# Patient Record
Sex: MALE | Race: BLACK OR AFRICAN AMERICAN | Employment: FULL TIME | URBAN - METROPOLITAN AREA
[De-identification: names, ages, dates, MRNs, and addresses within clinical notes are randomized per-mention and may not be internally consistent; named-entity substitution may affect disease eponyms.]

---

## 2017-03-20 ENCOUNTER — TELEPHONE (OUTPATIENT)
Dept: INTERNAL MEDICINE CLINIC | Facility: CLINIC | Age: 37
End: 2017-03-20

## 2017-03-22 ENCOUNTER — TELEPHONE (OUTPATIENT)
Dept: INTERNAL MEDICINE CLINIC | Facility: CLINIC | Age: 37
End: 2017-03-22

## 2017-05-02 ENCOUNTER — TELEPHONE (OUTPATIENT)
Dept: INTERNAL MEDICINE CLINIC | Facility: CLINIC | Age: 37
End: 2017-05-02

## 2017-06-14 ENCOUNTER — TELEPHONE (OUTPATIENT)
Dept: INTERNAL MEDICINE CLINIC | Facility: CLINIC | Age: 37
End: 2017-06-14

## 2017-06-15 RX ORDER — VALACYCLOVIR HYDROCHLORIDE 1 G/1
TABLET, FILM COATED ORAL
Refills: 2 | COMMUNITY
Start: 2017-05-04 | End: 2017-08-18

## 2017-07-06 RX ORDER — VALACYCLOVIR HYDROCHLORIDE 1 G/1
TABLET, FILM COATED ORAL
Qty: 30 TABLET | Refills: 0 | Status: SHIPPED | OUTPATIENT
Start: 2017-07-06 | End: 2017-08-10

## 2017-07-19 RX ORDER — ALUMINUM CHLORIDE 20 %
SOLUTION, NON-ORAL TOPICAL
Qty: 60 ML | Refills: 0 | Status: SHIPPED | OUTPATIENT
Start: 2017-07-19 | End: 2018-08-30

## 2017-08-02 ENCOUNTER — TELEPHONE (OUTPATIENT)
Dept: INTERNAL MEDICINE CLINIC | Facility: CLINIC | Age: 37
End: 2017-08-02

## 2017-08-15 RX ORDER — VALACYCLOVIR HYDROCHLORIDE 1 G/1
TABLET, FILM COATED ORAL
Qty: 30 TABLET | Refills: 0 | Status: SHIPPED | OUTPATIENT
Start: 2017-08-15 | End: 2017-09-14

## 2017-08-18 ENCOUNTER — OFFICE VISIT (OUTPATIENT)
Dept: INTERNAL MEDICINE CLINIC | Facility: CLINIC | Age: 37
End: 2017-08-18

## 2017-08-18 VITALS
SYSTOLIC BLOOD PRESSURE: 120 MMHG | WEIGHT: 219.25 LBS | RESPIRATION RATE: 12 BRPM | BODY MASS INDEX: 32.85 KG/M2 | HEART RATE: 68 BPM | DIASTOLIC BLOOD PRESSURE: 70 MMHG | TEMPERATURE: 98 F | OXYGEN SATURATION: 98 % | HEIGHT: 68.5 IN

## 2017-08-18 DIAGNOSIS — F98.8 ATTENTION DEFICIT DISORDER, UNSPECIFIED HYPERACTIVITY PRESENCE: ICD-10-CM

## 2017-08-18 DIAGNOSIS — Z00.00 ENCOUNTER FOR PREVENTATIVE ADULT HEALTH CARE EXAMINATION: Primary | ICD-10-CM

## 2017-08-18 PROCEDURE — 99385 PREV VISIT NEW AGE 18-39: CPT | Performed by: INTERNAL MEDICINE

## 2017-08-18 NOTE — PATIENT INSTRUCTIONS
- When you get blood work done through work, drop off a copy for Dr. Jazlyn Valverde  - Follow up in six months with Dr. Jazlyn Valverde. It was a pleasure seeing you in the clinic today.   Thank you for choosing the [facility] office for your health

## 2017-08-18 NOTE — PROGRESS NOTES
Deyvi Rodríguez is a 29year old male. HPI:   Patient presents with:  CPX  Patient presents for CPX/wellness examination. He gets full blood work annually through his employer - usually every fall. Is due for employee blood work in 2 month.     He exerci includes appendectomy and breast surgery procedure unlisted. Family: family history includes Diabetes in an other family member. Social:  reports that he has never smoked. He has never used smokeless tobacco. He reports that he drinks alcohol.  He reports asked to return to clinic in 6 months with Dr. Lucero Urias MD for follow up on chronic issues, or earlier if acute issues arise.     Dennis Trotter MD

## 2017-09-15 RX ORDER — VALACYCLOVIR HYDROCHLORIDE 1 G/1
TABLET, FILM COATED ORAL
Qty: 30 TABLET | Refills: 0 | Status: SHIPPED | OUTPATIENT
Start: 2017-09-15 | End: 2017-10-19

## 2017-10-19 RX ORDER — VALACYCLOVIR HYDROCHLORIDE 1 G/1
TABLET, FILM COATED ORAL
Qty: 30 TABLET | Refills: 0 | Status: SHIPPED | OUTPATIENT
Start: 2017-10-19 | End: 2017-11-22

## 2017-11-24 RX ORDER — VALACYCLOVIR HYDROCHLORIDE 1 G/1
TABLET, FILM COATED ORAL
Qty: 30 TABLET | Refills: 0 | Status: SHIPPED | OUTPATIENT
Start: 2017-11-24 | End: 2017-12-27

## 2017-12-27 RX ORDER — VALACYCLOVIR HYDROCHLORIDE 1 G/1
TABLET, FILM COATED ORAL
Qty: 30 TABLET | Refills: 0 | Status: SHIPPED | OUTPATIENT
Start: 2017-12-27 | End: 2018-01-30

## 2017-12-29 ENCOUNTER — TELEPHONE (OUTPATIENT)
Dept: INTERNAL MEDICINE CLINIC | Facility: CLINIC | Age: 37
End: 2017-12-29

## 2018-01-31 RX ORDER — VALACYCLOVIR HYDROCHLORIDE 1 G/1
TABLET, FILM COATED ORAL
Qty: 30 TABLET | Refills: 0 | Status: SHIPPED | OUTPATIENT
Start: 2018-01-31 | End: 2018-03-01

## 2018-02-01 NOTE — TELEPHONE ENCOUNTER
Left message that pt Rx ready for .   Pt must make appointment with dr. Sona Jones for further refills

## 2018-02-01 NOTE — TELEPHONE ENCOUNTER
Patient returning phone call. Informed pt RX ready to picked up at office & to bring ID. No additional refills until appointment scheduled/seen by   Patient understood.

## 2018-02-01 NOTE — TELEPHONE ENCOUNTER
Per Dr. José Rodrigues instruction patient is to f/u with you in February but no pending appointment, please advise.

## 2018-02-01 NOTE — TELEPHONE ENCOUNTER
Patient calling in requesting a refill for Lisdexamfetamine Dimesylate 40 MG Oral Cap.     (Control Substance, so pt will need to  script from office)

## 2018-03-02 RX ORDER — VALACYCLOVIR HYDROCHLORIDE 1 G/1
TABLET, FILM COATED ORAL
Qty: 30 TABLET | Refills: 0 | Status: SHIPPED | OUTPATIENT
Start: 2018-03-02 | End: 2018-03-07

## 2018-03-07 PROBLEM — F98.8 ATTENTION DEFICIT DISORDER (ADD) WITHOUT HYPERACTIVITY: Status: ACTIVE | Noted: 2018-03-07

## 2018-03-07 NOTE — PROGRESS NOTES
HPI:    Patient ID: Itz Toure is a 40year old male.     HPI  Here for ADD ck   taking meds as directed   Doing well w it   Admits to eating more as wife is pregnant    Due in  June     Overall feels well   No issues w meds    On recent HSV outbreaks   e

## 2018-04-09 RX ORDER — VALACYCLOVIR HYDROCHLORIDE 1 G/1
TABLET, FILM COATED ORAL
Qty: 30 TABLET | Refills: 0 | OUTPATIENT
Start: 2018-04-09

## 2018-05-24 ENCOUNTER — CHARTING TRANS (OUTPATIENT)
Dept: OTHER | Age: 38
End: 2018-05-24

## 2018-07-02 ENCOUNTER — TELEPHONE (OUTPATIENT)
Dept: INTERNAL MEDICINE CLINIC | Facility: CLINIC | Age: 38
End: 2018-07-02

## 2018-08-30 RX ORDER — ALUMINUM CHLORIDE 20 %
SOLUTION, NON-ORAL TOPICAL
Qty: 60 ML | Refills: 0 | Status: SHIPPED | OUTPATIENT
Start: 2018-08-30 | End: 2018-12-13

## 2018-08-30 NOTE — TELEPHONE ENCOUNTER
Medication(s) to Refill:   Pending Prescriptions Disp Refills    DRYSOL 20 % External Solution [Pharmacy Med Name: DRYSOL 21% SOLUTION W/ APPLICATOR] 60 mL 0     Sig: APPLY EXTERNALLY TO THE AFFECTED AREA AT NIGHT       Last Time Medication was Filled:  7/

## 2018-09-05 ENCOUNTER — TELEPHONE (OUTPATIENT)
Dept: INTERNAL MEDICINE CLINIC | Facility: CLINIC | Age: 38
End: 2018-09-05

## 2018-09-05 NOTE — TELEPHONE ENCOUNTER
PA request received from [employer] for :    Drysol 20 % Solution w/ applicator  Qty: 60  Apply externally to the affected area at night      Called placed to [employer] to start prior authorization request.    Spoke with Lakesha Baker who state no alterna

## 2018-09-15 ENCOUNTER — HOSPITAL ENCOUNTER (OUTPATIENT)
Age: 38
Discharge: HOME | End: 2018-09-15
Payer: COMMERCIAL

## 2018-09-15 ENCOUNTER — APPOINTMENT (OUTPATIENT)
Dept: GENERAL RADIOLOGY | Age: 38
End: 2018-09-15
Attending: PHYSICIAN ASSISTANT
Payer: COMMERCIAL

## 2018-09-15 VITALS
HEART RATE: 75 BPM | TEMPERATURE: 99 F | WEIGHT: 210 LBS | OXYGEN SATURATION: 97 % | HEIGHT: 68 IN | RESPIRATION RATE: 16 BRPM | SYSTOLIC BLOOD PRESSURE: 125 MMHG | DIASTOLIC BLOOD PRESSURE: 76 MMHG | BODY MASS INDEX: 31.83 KG/M2

## 2018-09-15 DIAGNOSIS — S86.911A KNEE STRAIN, RIGHT, INITIAL ENCOUNTER: Primary | ICD-10-CM

## 2018-09-15 PROCEDURE — 73560 X-RAY EXAM OF KNEE 1 OR 2: CPT | Performed by: PHYSICIAN ASSISTANT

## 2018-09-15 PROCEDURE — 99213 OFFICE O/P EST LOW 20 MIN: CPT

## 2018-09-15 RX ORDER — IBUPROFEN 600 MG/1
600 TABLET ORAL EVERY 8 HOURS PRN
Qty: 30 TABLET | Refills: 0 | Status: SHIPPED | OUTPATIENT
Start: 2018-09-15 | End: 2018-09-22

## 2018-09-15 NOTE — ED PROVIDER NOTES
Patient Seen in: [facility]    History   Patient presents with:  Knee Pain    Stated Complaint: R knee pain/inury     HPI    Luciana Fong is a 50-year-old male who comes in today complaining of right knee injury that occurred yesterday respiratory distress. He has no wheezes. He has no rales. Musculoskeletal:        Right hip: Normal.        Right knee: He exhibits decreased range of motion (pain with full extension and flexion), swelling and LCL laxity (mild ).  He exhibits no effusion diagnosis)    Disposition:  There is no disposition on file for this visit. There is no disposition time on file for this visit.     Follow-up:  Lore Genao MD  [address] [facility] [address]          Franklin Rivera MD  [address]

## 2018-10-31 ENCOUNTER — TELEPHONE (OUTPATIENT)
Dept: INTERNAL MEDICINE CLINIC | Facility: CLINIC | Age: 38
End: 2018-10-31

## 2018-12-13 ENCOUNTER — LAB ENCOUNTER (OUTPATIENT)
Dept: LAB | Age: 38
End: 2018-12-13
Attending: FAMILY MEDICINE
Payer: COMMERCIAL

## 2018-12-13 ENCOUNTER — OFFICE VISIT (OUTPATIENT)
Dept: INTERNAL MEDICINE CLINIC | Facility: CLINIC | Age: 38
End: 2018-12-13
Payer: COMMERCIAL

## 2018-12-13 VITALS
BODY MASS INDEX: 32.89 KG/M2 | RESPIRATION RATE: 16 BRPM | SYSTOLIC BLOOD PRESSURE: 130 MMHG | WEIGHT: 217 LBS | HEART RATE: 89 BPM | DIASTOLIC BLOOD PRESSURE: 82 MMHG | HEIGHT: 68.25 IN | OXYGEN SATURATION: 99 %

## 2018-12-13 DIAGNOSIS — Z00.00 LABORATORY EXAM ORDERED AS PART OF ROUTINE GENERAL MEDICAL EXAMINATION: ICD-10-CM

## 2018-12-13 DIAGNOSIS — Z00.00 WELLNESS EXAMINATION: Primary | ICD-10-CM

## 2018-12-13 PROCEDURE — 85025 COMPLETE CBC W/AUTO DIFF WBC: CPT | Performed by: FAMILY MEDICINE

## 2018-12-13 PROCEDURE — 99395 PREV VISIT EST AGE 18-39: CPT | Performed by: FAMILY MEDICINE

## 2018-12-13 PROCEDURE — 80061 LIPID PANEL: CPT | Performed by: FAMILY MEDICINE

## 2018-12-13 PROCEDURE — 81003 URINALYSIS AUTO W/O SCOPE: CPT | Performed by: FAMILY MEDICINE

## 2018-12-13 PROCEDURE — 80053 COMPREHEN METABOLIC PANEL: CPT | Performed by: FAMILY MEDICINE

## 2018-12-13 PROCEDURE — 36415 COLL VENOUS BLD VENIPUNCTURE: CPT | Performed by: FAMILY MEDICINE

## 2019-05-02 ENCOUNTER — TELEPHONE (OUTPATIENT)
Dept: INTERNAL MEDICINE CLINIC | Facility: CLINIC | Age: 39
End: 2019-05-02

## 2019-06-19 RX ORDER — VALACYCLOVIR HYDROCHLORIDE 1 G/1
TABLET, FILM COATED ORAL
Qty: 30 TABLET | Refills: 2 | Status: SHIPPED | OUTPATIENT
Start: 2019-06-19 | End: 2019-12-12

## 2019-06-19 NOTE — TELEPHONE ENCOUNTER
Patient is requesting a refill on his prescription for ValACYclovir HCl 1 G Oral Tab. Needs to be sent to [place] [id number] [facility], [address] [facility]  [address] [facility], [phone], [phone]. Please advise.

## 2019-08-21 ENCOUNTER — TELEPHONE (OUTPATIENT)
Dept: INTERNAL MEDICINE CLINIC | Facility: CLINIC | Age: 39
End: 2019-08-21

## 2019-08-21 NOTE — TELEPHONE ENCOUNTER
Patient is requesting a refill on his prescription for [facility] [address] [facility], [address] [facility]  [address] [facility], [phone], [phone]. Please advise.

## 2019-08-23 NOTE — TELEPHONE ENCOUNTER
Patient scheduled an appointment with Dr. Dorys Hunter on Wednesday 08/28/19 for his medication follow up. Patient would like his prescription for Vyvanse  to be called into the pharmacy .  [address] [place] [address] DRUG STORE [address] [place] [address]

## 2019-09-24 NOTE — PROGRESS NOTES
CHIEF COMPLAINT:     Patient presents with:  Medication Follow-Up      HPI:   Ashish Price is a 41year old male who returns for recheck of ADHD treatment. Has been using the stimulant medication on a regular basis.  Feels the medication helps improve focus developed, well nourished, in no apparent distress  SKIN: no rashes, no suspicious lesions  HEAD: atraumatic, normocephalic  EYES: conjunctiva clear, EOM intact  LUNGS: clear to auscultation bilaterally, no wheezes or rhonchi. Breathing is non labored.   CA

## 2019-12-12 RX ORDER — VALACYCLOVIR HYDROCHLORIDE 1 G/1
TABLET, FILM COATED ORAL
Qty: 30 TABLET | Refills: 0 | Status: SHIPPED | OUTPATIENT
Start: 2019-12-12 | End: 2020-02-12

## 2019-12-12 NOTE — TELEPHONE ENCOUNTER
VALACYCLOVIR HCL 1 G Oral Tab    Passed Protocol    Last OV relevant to medication: 99/24/2019  Last refill date: 6/19/2019     #/refills: #30 w/ 2 refills   When pt was asked to return for OV: 3 months   Upcoming appt/reason: no future appointments

## 2020-01-04 ENCOUNTER — OFFICE VISIT (OUTPATIENT)
Dept: INTERNAL MEDICINE CLINIC | Facility: CLINIC | Age: 40
End: 2020-01-04
Payer: COMMERCIAL

## 2020-01-04 VITALS
SYSTOLIC BLOOD PRESSURE: 120 MMHG | HEART RATE: 94 BPM | HEIGHT: 68.25 IN | OXYGEN SATURATION: 98 % | BODY MASS INDEX: 34.1 KG/M2 | WEIGHT: 225 LBS | RESPIRATION RATE: 16 BRPM | DIASTOLIC BLOOD PRESSURE: 88 MMHG

## 2020-01-04 DIAGNOSIS — N52.9 ERECTILE DYSFUNCTION, UNSPECIFIED ERECTILE DYSFUNCTION TYPE: ICD-10-CM

## 2020-01-04 DIAGNOSIS — Z00.00 WELLNESS EXAMINATION: Primary | ICD-10-CM

## 2020-01-04 DIAGNOSIS — F98.8 ATTENTION DEFICIT DISORDER (ADD) WITHOUT HYPERACTIVITY: ICD-10-CM

## 2020-01-04 DIAGNOSIS — Z00.00 LABORATORY EXAM ORDERED AS PART OF ROUTINE GENERAL MEDICAL EXAMINATION: ICD-10-CM

## 2020-01-04 PROCEDURE — 90715 TDAP VACCINE 7 YRS/> IM: CPT | Performed by: FAMILY MEDICINE

## 2020-01-04 PROCEDURE — 99395 PREV VISIT EST AGE 18-39: CPT | Performed by: FAMILY MEDICINE

## 2020-01-04 PROCEDURE — 99213 OFFICE O/P EST LOW 20 MIN: CPT | Performed by: FAMILY MEDICINE

## 2020-01-04 PROCEDURE — 90471 IMMUNIZATION ADMIN: CPT | Performed by: FAMILY MEDICINE

## 2020-01-04 PROCEDURE — 90472 IMMUNIZATION ADMIN EACH ADD: CPT | Performed by: FAMILY MEDICINE

## 2020-01-04 PROCEDURE — 90686 IIV4 VACC NO PRSV 0.5 ML IM: CPT | Performed by: FAMILY MEDICINE

## 2020-01-04 RX ORDER — TADALAFIL 10 MG/1
10 TABLET ORAL
Qty: 10 TABLET | Refills: 2 | Status: SHIPPED | OUTPATIENT
Start: 2020-01-04 | End: 2020-11-27 | Stop reason: ALTCHOICE

## 2020-01-04 NOTE — PROGRESS NOTES
HPI:    Patient ID: Macy Cole is a 46year old male.     HPI  Macy Cole is a 46year old male who presents for a complete physical exam.   HPI:       Wt Readings from Last 6 Encounters:  01/04/20 : 225 lb (102.1 kg)  10/09/19 : 214 lb (97.1 kg)  09/24 related   Got kurt Rosario from friend and seemed to help him  Asking for rx      NEURO: denies headaches  PSYCHE:on vyvanse daily   Working well     HEMATOLOGIC: denies hx of anemia  ENDOCRINE: denies thyroid history  ALL/ASTHMA: denies hx of allergy or asth orders of the defined types were placed in this encounter.       Meds This Visit:  Requested Prescriptions      No prescriptions requested or ordered in this encounter       Imaging & Referrals:  None       EP#[id number]

## 2020-02-07 NOTE — TELEPHONE ENCOUNTER
Patient calling in requesting a refill for RX Lisdexamfetamine Dimesylate (VYVANSE) 40 MG Oral Cap     Pharmacy: [facility] [address] [facility], [address] [facility]  [address] [facility], [phone], [phone]

## 2020-02-12 RX ORDER — VALACYCLOVIR HYDROCHLORIDE 1 G/1
TABLET, FILM COATED ORAL
Qty: 90 TABLET | Refills: 3 | Status: SHIPPED | OUTPATIENT
Start: 2020-02-12 | End: 2021-03-31

## 2020-02-12 NOTE — TELEPHONE ENCOUNTER
Valacyclovir 1 g 1 tab daily filled 12-12-19 30 tab with 0 refills     LOV 1-4-20   return for CPX in 1yr.   No upcoming apt file     OV 3-7-18   On recent HSV outbreaks

## 2020-03-13 NOTE — TELEPHONE ENCOUNTER
Lisdexamfetamine 40 mg  Last OV relevant to medication: 1-4-2020  Last refill date: 2-10-20 #/refills: 0  When pt was asked to return for OV: CPX 1yr  Upcoming appt/reason: none  Recent labs: none

## 2020-05-11 ENCOUNTER — PATIENT MESSAGE (OUTPATIENT)
Dept: INTERNAL MEDICINE CLINIC | Facility: CLINIC | Age: 40
End: 2020-05-11

## 2020-05-12 NOTE — TELEPHONE ENCOUNTER
From: Chi Angel  To: Du Mcgarry MD  Sent: 5/11/2020 7:31 PM CDT  Subject: Alessia Perez, I am in need of a refill on my vyvanse. 30mg extended release. Thank you and stay safe.      Rayo Martinez

## 2020-05-12 NOTE — TELEPHONE ENCOUNTER
Requesting refill.     LOV: 1/4/2020  Asked to return for CPX in 1 yr  Last filled 4/10/2020 #30 no refills

## 2020-05-12 NOTE — TELEPHONE ENCOUNTER
From: Brett Gamino  To: Adiel Loredo MD  Sent: 5/11/2020 7:33 PM CDT  Subject: Prescription Question    I am sorry I meant 40mg extended release of vyvanse. It has been a long day. Thank you and have a lexii.      Mariella Pyle

## 2020-06-15 ENCOUNTER — PATIENT MESSAGE (OUTPATIENT)
Dept: INTERNAL MEDICINE CLINIC | Facility: CLINIC | Age: 40
End: 2020-06-15

## 2020-06-15 NOTE — TELEPHONE ENCOUNTER
From: Cece Arriaga  To: Aby Thomson MD  Sent: 6/15/2020 10:18 AM CDT  Subject: Cruzito Givens, may I please have a refill on my vyvanse. 40mg. Thank you and stay safe.     Tulio Orourke

## 2020-07-16 ENCOUNTER — PATIENT MESSAGE (OUTPATIENT)
Dept: INTERNAL MEDICINE CLINIC | Facility: CLINIC | Age: 40
End: 2020-07-16

## 2020-07-16 NOTE — TELEPHONE ENCOUNTER
Yari Kern RN 7/16/2020 12:13 PM CDT      ----- Message -----  From: Edyta Vann  Sent: 7/16/2020 11:36 AM CDT  To: [facility] Clinical Staff  Subject: Chandrika Melton, hopefully you and the family are well.  May I have a refill

## 2020-07-17 NOTE — TELEPHONE ENCOUNTER
LOV: 1/4/2020 with Dr. Maria G Rashid  RTC: 1 year  Last Relevant Labs: 12/13/18   Filled: 6/15/2020  #30 with 0 refills    No future appointments.

## 2020-08-17 ENCOUNTER — PATIENT MESSAGE (OUTPATIENT)
Dept: INTERNAL MEDICINE CLINIC | Facility: CLINIC | Age: 40
End: 2020-08-17

## 2020-08-19 ENCOUNTER — PATIENT MESSAGE (OUTPATIENT)
Dept: INTERNAL MEDICINE CLINIC | Facility: CLINIC | Age: 40
End: 2020-08-19

## 2020-08-19 NOTE — TELEPHONE ENCOUNTER
From: Darrow Dandy  To: Chetan Shah MD  Sent: 8/19/2020 1:08 PM CDT  Subject: Prescription Tiffani Rodger Dr. Familia Mccarty, how are you doing? Can you please call in a refill on my vyvanse at your earliest convenience. Thank you and stay safe.  Glenn Hyde

## 2020-08-19 NOTE — TELEPHONE ENCOUNTER
Lisdexamfetamine 40mg last filled 7/17/20 #30    LOV 1/4/20 - CPX     Labs ordered 1/4/20 -  Not completed

## 2020-09-17 ENCOUNTER — PATIENT MESSAGE (OUTPATIENT)
Dept: INTERNAL MEDICINE CLINIC | Facility: CLINIC | Age: 40
End: 2020-09-17

## 2020-09-17 NOTE — TELEPHONE ENCOUNTER
LOV: 1/4/2020 with Dr. Harley Perez  RTC: 1 year  Last Relevant Labs: 12/13/2018   Filled: 8/19/2020  #30 with 0 refills    No future appointments.

## 2020-09-17 NOTE — TELEPHONE ENCOUNTER
From: Dee Dee Linn  To: Carina Angela MD  Sent: 9/17/2020 11:34 AM CDT  Subject: Bertha Ricks, hopefully you and the family are well. May I please have a refill on my vyvanse. Thank you and take care.     Supriya Ramirez

## 2020-10-16 ENCOUNTER — PATIENT MESSAGE (OUTPATIENT)
Dept: INTERNAL MEDICINE CLINIC | Facility: CLINIC | Age: 40
End: 2020-10-16

## 2020-10-19 NOTE — TELEPHONE ENCOUNTER
Medication(s) to Refill:   Requested Prescriptions     Pending Prescriptions Disp Refills   • Lisdexamfetamine Dimesylate 40 MG Oral Cap 30 capsule 0     Sig: Take 1 capsule (40 mg total) by mouth every morning.        LOV: 1/4/2020   RTC: 1 yr for cpx

## 2020-11-17 RX ORDER — LISDEXAMFETAMINE DIMESYLATE CAPSULES 40 MG/1
40 CAPSULE ORAL EVERY MORNING
Qty: 30 CAPSULE | Refills: 0 | OUTPATIENT
Start: 2020-11-17

## 2020-11-21 RX ORDER — LISDEXAMFETAMINE DIMESYLATE CAPSULES 40 MG/1
40 CAPSULE ORAL EVERY MORNING
Qty: 30 CAPSULE | Refills: 0 | OUTPATIENT
Start: 2020-11-21

## 2020-11-24 RX ORDER — LISDEXAMFETAMINE DIMESYLATE CAPSULES 40 MG/1
40 CAPSULE ORAL EVERY MORNING
Qty: 30 CAPSULE | Refills: 0 | OUTPATIENT
Start: 2020-11-24

## 2020-11-24 RX ORDER — LISDEXAMFETAMINE DIMESYLATE CAPSULES 40 MG/1
40 CAPSULE ORAL EVERY MORNING
Qty: 30 CAPSULE | Refills: 0 | Status: CANCELLED | OUTPATIENT
Start: 2020-11-24

## 2020-11-25 ENCOUNTER — WALK IN (OUTPATIENT)
Dept: URGENT CARE | Age: 40
End: 2020-11-25
Attending: EMERGENCY MEDICINE

## 2020-11-25 ENCOUNTER — HOSPITAL ENCOUNTER (OUTPATIENT)
Dept: GENERAL RADIOLOGY | Age: 40
Discharge: HOME | End: 2020-11-25
Attending: EMERGENCY MEDICINE

## 2020-11-25 VITALS
DIASTOLIC BLOOD PRESSURE: 67 MMHG | TEMPERATURE: 96.4 F | OXYGEN SATURATION: 98 % | HEART RATE: 78 BPM | SYSTOLIC BLOOD PRESSURE: 124 MMHG | RESPIRATION RATE: 20 BRPM

## 2020-11-25 DIAGNOSIS — M54.31 SCIATICA OF RIGHT SIDE: Primary | ICD-10-CM

## 2020-11-25 DIAGNOSIS — M54.31 SCIATICA OF RIGHT SIDE: ICD-10-CM

## 2020-11-25 PROCEDURE — 10002803 HB RX 637: Performed by: EMERGENCY MEDICINE

## 2020-11-25 PROCEDURE — 99203 OFFICE O/P NEW LOW 30 MIN: CPT

## 2020-11-25 PROCEDURE — 72110 X-RAY EXAM L-2 SPINE 4/>VWS: CPT

## 2020-11-25 RX ORDER — METHYLPREDNISOLONE 4 MG/1
4 TABLET ORAL SEE ADMIN INSTRUCTIONS
Qty: 21 TABLET | Refills: 0 | Status: SHIPPED | OUTPATIENT
Start: 2020-11-25

## 2020-11-25 RX ORDER — CYCLOBENZAPRINE HCL 5 MG
5 TABLET ORAL 3 TIMES DAILY PRN
Qty: 21 TABLET | Refills: 0 | Status: SHIPPED | OUTPATIENT
Start: 2020-11-25 | End: 2020-12-02

## 2020-11-25 RX ORDER — HYDROCODONE BITARTRATE AND ACETAMINOPHEN 5; 325 MG/1; MG/1
2 TABLET ORAL ONCE
Status: COMPLETED | OUTPATIENT
Start: 2020-11-25 | End: 2020-11-25

## 2020-11-25 RX ORDER — HYDROCODONE BITARTRATE AND ACETAMINOPHEN 5; 325 MG/1; MG/1
1 TABLET ORAL EVERY 6 HOURS PRN
Qty: 16 TABLET | Refills: 0 | Status: SHIPPED | OUTPATIENT
Start: 2020-11-25 | End: 2020-11-29

## 2020-11-25 RX ADMIN — HYDROCODONE BITARTRATE AND ACETAMINOPHEN 2 TABLET: 5; 325 TABLET ORAL at 12:18

## 2020-11-27 NOTE — PROGRESS NOTES
CHIEF COMPLAINT:     Patient presents with:  Medication Follow-Up      HPI:   Deyvi Rodríguez is a 45year old male Patient returns for recheck of ADHD treatment. Has been using the stimulant medication on a regular basis.  Feels the medication has been helpin social    Drug use: No       REVIEW OF SYSTEMS:   GENERAL: Denies fever, chills,weight change, decreased appetite  EYES: Denies blurred vision or double vision  CHEST: Denies chest pain, or palpitations  LUNGS: Denies shortness of breath, cough, or wheezin

## 2020-12-17 ENCOUNTER — IMMUNIZATION (OUTPATIENT)
Dept: LAB | Facility: HOSPITAL | Age: 40
End: 2020-12-17
Attending: PREVENTIVE MEDICINE
Payer: COMMERCIAL

## 2020-12-17 DIAGNOSIS — Z23 NEED FOR VACCINATION: ICD-10-CM

## 2020-12-17 PROCEDURE — 0001A PFIZER-BIONTECH COVID-19 VACCINE: CPT

## 2020-12-28 ENCOUNTER — TELEPHONE (OUTPATIENT)
Dept: INTERNAL MEDICINE CLINIC | Facility: CLINIC | Age: 40
End: 2020-12-28

## 2020-12-28 DIAGNOSIS — F98.8 ATTENTION DEFICIT DISORDER (ADD) WITHOUT HYPERACTIVITY: ICD-10-CM

## 2020-12-28 NOTE — TELEPHONE ENCOUNTER
Vyvanse 40 mg  Filled 12-28-20  Qty 30  0 refills   LOV 11-27-20    Pt still has 1 more month on 1-28-21.

## 2020-12-29 DIAGNOSIS — F98.8 ATTENTION DEFICIT DISORDER (ADD) WITHOUT HYPERACTIVITY: ICD-10-CM

## 2020-12-30 NOTE — TELEPHONE ENCOUNTER
Patient is calling to check on the status of his refill request for Lisdexamfetamine Dimesylate 40 mg Oral Daily. Patient is out of refills. He would like to speak with a nurse. [address] [facility], [address] [facility] [address]

## 2021-01-07 ENCOUNTER — IMMUNIZATION (OUTPATIENT)
Dept: LAB | Facility: HOSPITAL | Age: 41
End: 2021-01-07
Attending: PREVENTIVE MEDICINE
Payer: COMMERCIAL

## 2021-01-07 DIAGNOSIS — Z23 NEED FOR VACCINATION: ICD-10-CM

## 2021-01-07 PROCEDURE — 0002A SARSCOV2 VAC 30MCG/0.3ML IM: CPT

## 2021-02-01 DIAGNOSIS — F98.8 ATTENTION DEFICIT DISORDER (ADD) WITHOUT HYPERACTIVITY: ICD-10-CM

## 2021-02-03 NOTE — TELEPHONE ENCOUNTER
Called pharmacy to see if rx was filled on 1/28   Pharmacist stated that pt picked up on 2/1   No need for a refill at this time     No protocol   Medication(s) to Refill:   Requested Prescriptions     Pending Prescriptions Disp Refills   • Lisdexamfetamin

## 2021-03-02 ENCOUNTER — MED REC SCAN ONLY (OUTPATIENT)
Dept: INTERNAL MEDICINE CLINIC | Facility: CLINIC | Age: 41
End: 2021-03-02

## 2021-03-03 NOTE — TELEPHONE ENCOUNTER
Lisdexamfetamine Dimesylate 40 MG Oral Cap           Possible duplicate: Polina to review recent actions on this medication    Sig: Take 1 capsule (40 mg total) by mouth every morning.     Disp:  30 capsule    Refills:  0    Start: 3/1/2021    Earliest Fill

## 2021-03-04 NOTE — TELEPHONE ENCOUNTER
Medication(s) to Refill:   Requested Prescriptions     Pending Prescriptions Disp Refills   • Lisdexamfetamine Dimesylate 40 MG Oral Cap 30 capsule 0     Sig: Take 1 capsule (40 mg total) by mouth every morning.        LOV:  11-    RTC: 3 months

## 2021-03-31 RX ORDER — VALACYCLOVIR HYDROCHLORIDE 1 G/1
1 TABLET, FILM COATED ORAL DAILY
Qty: 90 TABLET | Refills: 0 | Status: SHIPPED | OUTPATIENT
Start: 2021-03-31 | End: 2021-06-01

## 2021-03-31 NOTE — TELEPHONE ENCOUNTER
Medication(s) to Refill:   Requested Prescriptions     Pending Prescriptions Disp Refills   • valACYclovir HCl 1 G Oral Tab 90 tablet 0     Sig: Take 1 tablet (1,000 mg total) by mouth daily.    • Lisdexamfetamine Dimesylate 40 MG Oral Cap 30 capsule 0

## 2021-04-30 ENCOUNTER — TELEPHONE (OUTPATIENT)
Dept: INTERNAL MEDICINE CLINIC | Facility: CLINIC | Age: 41
End: 2021-04-30

## 2021-05-03 RX ORDER — LISDEXAMFETAMINE DIMESYLATE CAPSULES 40 MG/1
40 CAPSULE ORAL EVERY MORNING
Qty: 30 CAPSULE | Refills: 0 | Status: CANCELLED | OUTPATIENT
Start: 2021-05-03

## 2021-05-03 RX ORDER — LISDEXAMFETAMINE DIMESYLATE CAPSULES 40 MG/1
40 CAPSULE ORAL EVERY MORNING
Qty: 30 CAPSULE | Refills: 0 | OUTPATIENT
Start: 2021-05-03

## 2021-05-03 RX ORDER — LISDEXAMFETAMINE DIMESYLATE CAPSULES 40 MG/1
40 CAPSULE ORAL EVERY MORNING
Qty: 15 CAPSULE | Refills: 0 | OUTPATIENT
Start: 2021-05-03

## 2021-05-03 NOTE — TELEPHONE ENCOUNTER
Future Appointments   Date Time Provider Zoltan Leblanc   5/5/2021  4:00 PM TEAGAN Malin [facility] [place]

## 2021-05-03 NOTE — TELEPHONE ENCOUNTER
Responded to pts Mychart message informing him that he is due for CPX a well as lab work - pt also informed that lab orders have been placed. #15 qty pended as temp fill until pt is seen      Requesting Lisdexamfetamine Dimesylate 40 MG Oral Cap  LOV: 11/27/20  RTC: 3 months  Last Relevant Labs: 12/13/18  Filled: 3/31/21 #30 with 0 refills    No future appointments.

## 2021-05-03 NOTE — TELEPHONE ENCOUNTER
Pt called and made appointment with dr Cece Orta on 5/5.      Pt advd he is out of medication listed below -     Lisdexamfetamine Dimesylate 40 MG Oral Cap 30 capsule       [facility] #[id number] - [address], [address] [facility], [phone], [phone]

## 2021-05-05 RX ORDER — LISDEXAMFETAMINE DIMESYLATE CAPSULES 40 MG/1
40 CAPSULE ORAL EVERY MORNING
Qty: 30 CAPSULE | Refills: 0 | Status: CANCELLED | OUTPATIENT
Start: 2021-05-05

## 2021-05-05 NOTE — TELEPHONE ENCOUNTER
Patient had melissa appointment with [facility] today but she is out sick. Patient rescheduled for Monday but has no more medication.      Future Appointments   Date Time Provider Zoltan Leblanc   5/10/2021 11:00 AM TEAGAN Luna [facility] [place]

## 2021-05-05 NOTE — TELEPHONE ENCOUNTER
Last OV:  Last labs:***  Last time medication was refilled:***  Medication routed to PCP for refill if appropriate.    Future Appointments   Date Time Provider Zoltan Leblanc   5/10/2021 11:00 AM TEAGAN Alexander [facility] [place]

## 2021-05-06 NOTE — TELEPHONE ENCOUNTER
Patient has upcoming appointment with Ruperto Goldstein    Future Appointments   Date Time Provider Zoltan Leblanc   5/10/2021 11:00 AM TEAGAN Rosales [facility] [place]

## 2021-05-10 PROBLEM — R73.01 IMPAIRED FASTING GLUCOSE: Status: ACTIVE | Noted: 2021-05-10

## 2021-05-10 PROBLEM — E78.2 MIXED HYPERLIPIDEMIA: Status: ACTIVE | Noted: 2021-05-10

## 2021-05-10 NOTE — PROGRESS NOTES
Jaziel Mahoney is a 45year old male. HPI:   Patient presents for recheck of his ADHD. Patient has been using the stimulant medication, Vyvance, on a regular basis. Patient was last seen 6 months ago. Medication changes at that time: none.  Patient feel tobacco: Never Used    Vaping Use      Vaping Use: Never used    Alcohol use: Yes      Comment: social    Drug use: No        REVIEW OF SYSTEMS:   GENERAL:  Denies fever, chills,decreased appetite and weakness. SKIN: Denies rashes, skin wounds or ulcers. daily.  Dispense: 30 capsule; Refill: 0  - Lisdexamfetamine Dimesylate (VYVANSE) 40 MG Oral Cap; Take 1 capsule (40 mg total) by mouth daily. Dispense: 30 capsule; Refill: 0  - Lisdexamfetamine Dimesylate (VYVANSE) 40 MG Oral Cap;  Take 1 capsule (40 mg to

## 2021-05-28 ENCOUNTER — LAB ENCOUNTER (OUTPATIENT)
Dept: LAB | Age: 41
End: 2021-05-28
Attending: FAMILY MEDICINE
Payer: COMMERCIAL

## 2021-05-28 ENCOUNTER — OFFICE VISIT (OUTPATIENT)
Dept: INTERNAL MEDICINE CLINIC | Facility: CLINIC | Age: 41
End: 2021-05-28
Payer: COMMERCIAL

## 2021-05-28 VITALS
SYSTOLIC BLOOD PRESSURE: 115 MMHG | BODY MASS INDEX: 32.06 KG/M2 | OXYGEN SATURATION: 98 % | DIASTOLIC BLOOD PRESSURE: 70 MMHG | HEIGHT: 68.5 IN | HEART RATE: 82 BPM | WEIGHT: 214 LBS | TEMPERATURE: 99 F | RESPIRATION RATE: 16 BRPM

## 2021-05-28 DIAGNOSIS — E78.2 MIXED HYPERLIPIDEMIA: ICD-10-CM

## 2021-05-28 DIAGNOSIS — R73.01 IMPAIRED FASTING GLUCOSE: ICD-10-CM

## 2021-05-28 DIAGNOSIS — R59.1 LYMPHADENOPATHY: ICD-10-CM

## 2021-05-28 DIAGNOSIS — S16.1XXA STRAIN OF NECK MUSCLE, INITIAL ENCOUNTER: ICD-10-CM

## 2021-05-28 DIAGNOSIS — R09.82 POST-NASAL DRIP: Primary | ICD-10-CM

## 2021-05-28 PROCEDURE — 80061 LIPID PANEL: CPT | Performed by: FAMILY MEDICINE

## 2021-05-28 PROCEDURE — 3074F SYST BP LT 130 MM HG: CPT | Performed by: NURSE PRACTITIONER

## 2021-05-28 PROCEDURE — 82947 ASSAY GLUCOSE BLOOD QUANT: CPT | Performed by: FAMILY MEDICINE

## 2021-05-28 PROCEDURE — 99213 OFFICE O/P EST LOW 20 MIN: CPT | Performed by: NURSE PRACTITIONER

## 2021-05-28 PROCEDURE — 3078F DIAST BP <80 MM HG: CPT | Performed by: NURSE PRACTITIONER

## 2021-05-28 PROCEDURE — 83036 HEMOGLOBIN GLYCOSYLATED A1C: CPT | Performed by: FAMILY MEDICINE

## 2021-05-28 PROCEDURE — 3008F BODY MASS INDEX DOCD: CPT | Performed by: NURSE PRACTITIONER

## 2021-05-28 NOTE — PATIENT INSTRUCTIONS
Neck Clock (Flexibility)    1. Lie on your back on the floor, with your knees bent and your feet flat on the floor. Place a rolled-up towel or neck roll under your neck. 2. Close your eyes and imagine a clock face.  With your nose, slowly trace the outer garcía Fowler last reviewed this educational content on 3/1/2020  © 3429-7928 [employer]. All rights reserved. This information is not intended as a substitute for professional medical care.  Always follow your healthcare professional's in Then turn to the other side. · Repeat  5 times on each side.   Note: Keep your shoulders on the floor. Don’t lift your chin as you turn your head. Janeth last reviewed this educational content on 7/1/2020  © 6923-8428 [employer].  All rig

## 2021-05-28 NOTE — PROGRESS NOTES
CHIEF COMPLAINT:     Patient presents with:  Post Nasal Drip: For the past weeks;      HPI:   Chi Angel is a 45year old male here with concerns of sore throat and neck for the past 2 weeks.  Pt states he is physically active in [employer] and wrestling and SpO2 98%   BMI 32.07 kg/m²   GENERAL: well developed, well nourished,in no apparent distress  SKIN: no rashes, no suspicious lesions  HEAD: atraumatic, normocephalic  EYES: conjunctiva clear, EOM intact, PERRLA  EARS: TM's translucent, no bulging, no ret

## 2021-06-01 DIAGNOSIS — F98.8 ATTENTION DEFICIT DISORDER (ADD) WITHOUT HYPERACTIVITY: ICD-10-CM

## 2021-06-01 RX ORDER — VALACYCLOVIR HYDROCHLORIDE 1 G/1
1 TABLET, FILM COATED ORAL DAILY
Qty: 90 TABLET | Refills: 0 | Status: SHIPPED | OUTPATIENT
Start: 2021-06-01 | End: 2021-07-11

## 2021-06-01 NOTE — TELEPHONE ENCOUNTER
Vyvanse 40 mg  Filled 5-10-21  3 scripts on file for 5-10-21, 06-10-21, 07-11-21. Pt to call pharmacy to fill since all scripts were sent. Qty 30  0 refills  No upcoming orders  LOV 5-10-21  RTC 6 mo.

## 2021-06-07 ENCOUNTER — PATIENT MESSAGE (OUTPATIENT)
Dept: INTERNAL MEDICINE CLINIC | Facility: CLINIC | Age: 41
End: 2021-06-07

## 2021-06-07 DIAGNOSIS — F98.8 ATTENTION DEFICIT DISORDER (ADD) WITHOUT HYPERACTIVITY: ICD-10-CM

## 2021-06-07 NOTE — TELEPHONE ENCOUNTER
From: Em Oconnor  To: Sabina Alatorre MD  Sent: 6/7/2021 12:05 PM CDT  Subject: Prescription Marsha Tello, yesterday my prescription to vyvanse ran out. Is there anything I need to do to get a refill? I have sent a request twice via [employer].

## 2021-06-08 NOTE — TELEPHONE ENCOUNTER
LOV: 5/28/2021 with TEAGAN Austin  RTC: no follow-up on file  Last Relevant Labs: 5/28/2021   Filled: 5/10/2021   #30 with 0 refills    No future appointments.

## 2021-07-12 ENCOUNTER — PATIENT MESSAGE (OUTPATIENT)
Dept: INTERNAL MEDICINE CLINIC | Facility: CLINIC | Age: 41
End: 2021-07-12

## 2021-07-12 DIAGNOSIS — F98.8 ATTENTION DEFICIT DISORDER (ADD) WITHOUT HYPERACTIVITY: ICD-10-CM

## 2021-07-12 RX ORDER — VALACYCLOVIR HYDROCHLORIDE 1 G/1
1 TABLET, FILM COATED ORAL DAILY
Qty: 90 TABLET | Refills: 0 | Status: SHIPPED | OUTPATIENT
Start: 2021-07-12 | End: 2021-11-30

## 2021-07-12 RX ORDER — LISDEXAMFETAMINE DIMESYLATE CAPSULES 40 MG/1
40 CAPSULE ORAL DAILY
Qty: 30 CAPSULE | Refills: 0 | OUTPATIENT
Start: 2021-07-12 | End: 2021-08-11

## 2021-07-12 RX ORDER — LISDEXAMFETAMINE DIMESYLATE CAPSULES 40 MG/1
40 CAPSULE ORAL EVERY MORNING
Qty: 30 CAPSULE | Refills: 0 | OUTPATIENT
Start: 2021-07-12

## 2021-07-12 NOTE — TELEPHONE ENCOUNTER
Valacyclovir   Filled 6-1-21  Qty 90  0 refills  No upcoming appt. Lisdexamfetamine 40 mg  Filled 6-8-21  Qty 30  0 refills  No upcoming appt.   LOV 5-10-21

## 2021-07-12 NOTE — TELEPHONE ENCOUNTER
New rx needed to [facility] #[id number]  Previously sent to kayleigh Funes unable to transfer     Lisdexamfetamine Dimesylate (VYVANSE) 40 MG Oral Cap

## 2021-07-13 NOTE — TELEPHONE ENCOUNTER
From: Itz Toure  To: Cait Alba MD  Sent: 7/12/2021 5:46 PM CDT  Subject: Prescription Question    Can you please send a script for vyvanse to the [facility] on the corner of [address] The address is [address] Thank you.

## 2021-07-13 NOTE — TELEPHONE ENCOUNTER
From: Linh Fischer  To:  TEAGAN García  Sent: 7/12/2021 5:48 PM CDT  Subject: Prescription Question    Can you please send a script for a refill of vyvanse to the [facility] in [place] on the corner of [address] The address [address] [place]

## 2021-08-13 ENCOUNTER — PATIENT MESSAGE (OUTPATIENT)
Dept: INTERNAL MEDICINE CLINIC | Facility: CLINIC | Age: 41
End: 2021-08-13

## 2021-08-13 DIAGNOSIS — F98.8 ATTENTION DEFICIT DISORDER (ADD) WITHOUT HYPERACTIVITY: ICD-10-CM

## 2021-08-14 NOTE — TELEPHONE ENCOUNTER
From: Ashish Price  To: TEAGAN Camacho  Sent: 8/13/2021 10:32 PM CDT  Subject: Prescription Question    Hello, may I please have a refill on my vyvanse and can it please be sent to the [facility] on [address] on [place]? Thank you.      Deb Quinones

## 2021-08-14 NOTE — TELEPHONE ENCOUNTER
From: Linh Fischer  To: Alma Rosa Branch MD  Sent: 8/13/2021 10:30 PM CDT  Subject: Prescription Question    Hello, may I please have a refill on my vyvanse. Can it please be sent to the pharmacy on [address] in [place] [address]. Thank you.      Keagan Rojas

## 2021-08-14 NOTE — TELEPHONE ENCOUNTER
No Protocol     Requesting: Vyvanse 40mg      LOV: 5/28/21 with KR  RTC: if sx persist   Filled: 7/13/21 #30 0 refill   Recent Labs: 5/28/21     Upcoming OV: none scheduled

## 2021-09-14 DIAGNOSIS — F98.8 ATTENTION DEFICIT DISORDER (ADD) WITHOUT HYPERACTIVITY: ICD-10-CM

## 2021-09-14 NOTE — TELEPHONE ENCOUNTER
Lisdexamfetamine Dimesylate (VYVANSE) 40 MG Oral Cap          Sig: Take 1 capsule (40 mg total) by mouth daily.     Disp:  30 capsule    Refills:  0    Start: 9/14/2021 - 10/14/2021    Earliest Fill Date: 9/14/2021    Class: Normal    Non-formulary For: Tollie Navy

## 2021-10-11 DIAGNOSIS — F98.8 ATTENTION DEFICIT DISORDER (ADD) WITHOUT HYPERACTIVITY: ICD-10-CM

## 2021-11-15 NOTE — PROGRESS NOTES
Abbie Griffin is a 37year old male. HPI:   Patient presents for recheck of his ADHD. Patient has been using the stimulant medication, Vyvanse 40 mg, on a regular basis. Patient was last seen 6 months ago. Medication changes at that time: none.   Fransisco (1.74 m)   Wt 217 lb (98.4 kg)   BMI 32.51 kg/m²   GENERAL: well developed, well nourished,in no apparent distress  LUNGS: clear to auscultation  CARDIO: RRR without murmur  PSYCH:  Alert, good attention.   Mood, affect, and behavior is normal.    ASSESSMEN

## 2021-11-30 RX ORDER — VALACYCLOVIR HYDROCHLORIDE 1 G/1
1000 TABLET, FILM COATED ORAL DAILY
Qty: 90 TABLET | Refills: 0 | Status: SHIPPED | OUTPATIENT
Start: 2021-11-30 | End: 2022-01-17

## 2022-01-17 RX ORDER — VALACYCLOVIR HYDROCHLORIDE 1 G/1
1000 TABLET, FILM COATED ORAL DAILY
Qty: 90 TABLET | Refills: 0 | Status: SHIPPED | OUTPATIENT
Start: 2022-01-17

## 2022-01-17 NOTE — TELEPHONE ENCOUNTER
valACYclovir 1 G Oral Tab          Sig: Take 1 tablet (1,000 mg total) by mouth daily.     Disp:  90 tablet    Refills:  0    Start: 1/17/2022    Class: Normal    Non-formulary    Last ordered: 1 month ago by Harvey Resendiz MD     Herpes Agent Protocol [place]

## 2022-01-17 NOTE — TELEPHONE ENCOUNTER
Lisdexamfetamine Dimesylate (VYVANSE) 50 MG Oral Cap          Sig: Take 1 capsule (50 mg total) by mouth every morning.     Disp:  30 capsule    Refills:  0    Start: 1/17/2022    Earliest Fill Date: 1/17/2022    Class: Normal    Non-formulary    Last order

## 2022-02-03 ENCOUNTER — TELEPHONE (OUTPATIENT)
Dept: INTERNAL MEDICINE CLINIC | Facility: CLINIC | Age: 42
End: 2022-02-03

## 2022-02-03 NOTE — TELEPHONE ENCOUNTER
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 Paperwork placed at dr. Jeanine Oliveros folder to review.

## 2022-02-07 ENCOUNTER — TELEPHONE (OUTPATIENT)
Dept: INTERNAL MEDICINE CLINIC | Facility: CLINIC | Age: 42
End: 2022-02-07

## 2022-02-07 LAB
ALBUMIN: 4.3 G/DL
ALKALINE PHOSPHATASE: 48
ALT: 23
ANION GAP: 10.7
AST: 21
BASO (ABSOLUTE): 0.1 X10E3/UL
BASOPHIL %: 0.6
BILIRUBIN, TOTAL: 0.6 MG/DL
BLOOD URINE: NEGATIVE
BUN: 25 MG/DL (ref 7–22)
CALCIUM: 9.2
CARBON DIOXIDE: 27.3
CHLORIDE: 103
CHOL/HDL RATIO: 3.5
CLARITY URINE: CLEAR
COLOR: YELLOW
CREATININE: 0.9 MG/DL
EOS (ABSOLUTE): 0.1 X10E3/UL
EOSINOPHIL %: 0.9
GLUCOSE: 84
HCT: 41
HDL CHOLESTEROL: 64 MG/DL
HGB: 13.6 G/DL
LDL CHOLESTEROL: 150 MG/DL (ref ?–130)
LYMPHOCYTE %: 12.5
LYMPHOCYTE ABSOLUTE: 1.2
Lab: NEGATIVE
MCH: 31.4 PG
MCHC: 33.2 G/DL
MCV: 94.7 FL
MONOCYTE %: 5.7
MONOCYTES(ABSOLUTE): 0.6
MPV: 7.6
NEUTROPHIL %: 80.3
NEUTROPHIL ABSOLUTE: 7.8
NITRITE, URINE: NEGATIVE
NON HDL CHOL: 157
PLT: 314
POTASSIUM: 4.2
RBC: 4.33 /HPF
RDW: 13.6 %
SODIUM: 141
SPECIFIC GRAVITY, URINE: 1.03
TOTAL CHOLESTEROL: 221 MG/DL (ref ?–200)
TOTAL PROTEIN: 6.9
TRIGLYCERIDES: 35 MG/DL
URINE BILIRUBIN: NEGATIVE
URINE GLUCOSE: NEGATIVE MG/DL
URINE KETONE: NEGATIVE
URINE LEUKOCYTE ESTERASE: NEGATIVE
URINE PH: 5
URINE PROTEIN: NEGATIVE MG /DL
UROBILINOGEN URINE: NEGATIVE
VLDL: 7
WBC: 9.7 /HPF

## 2022-02-07 NOTE — TELEPHONE ENCOUNTER
Incoming fax from [address] [place] [address] BB    Patients [place] entered into [address]

## 2022-02-19 RX ORDER — LISDEXAMFETAMINE DIMESYLATE CAPSULES 50 MG/1
50 CAPSULE ORAL EVERY MORNING
Qty: 30 CAPSULE | Refills: 0 | Status: SHIPPED | OUTPATIENT
Start: 2022-02-19 | End: 2022-03-16

## 2022-03-16 RX ORDER — VALACYCLOVIR HYDROCHLORIDE 1 G/1
1000 TABLET, FILM COATED ORAL DAILY
Qty: 90 TABLET | Refills: 0 | Status: SHIPPED | OUTPATIENT
Start: 2022-03-16

## 2022-03-16 NOTE — TELEPHONE ENCOUNTER
mychart (Active today) message sent to pt reminding pt on his LOV, he was told to schedule his Px in Jan. 2022. Vyvanse 50 mg  Filled 2-19-22  Qty 30  0 refills  No upcoming appt.   LOV 11-15-21

## 2022-04-15 RX ORDER — ROSUVASTATIN CALCIUM 5 MG/1
5 TABLET, COATED ORAL NIGHTLY
Qty: 90 TABLET | Refills: 0 | Status: CANCELLED | OUTPATIENT
Start: 2022-04-15

## 2022-04-15 RX ORDER — VALACYCLOVIR HYDROCHLORIDE 1 G/1
1000 TABLET, FILM COATED ORAL DAILY
Qty: 90 TABLET | Refills: 0 | Status: CANCELLED | OUTPATIENT
Start: 2022-04-15

## 2022-05-13 RX ORDER — VALACYCLOVIR HYDROCHLORIDE 1 G/1
1000 TABLET, FILM COATED ORAL DAILY
Qty: 90 TABLET | Refills: 0 | Status: SHIPPED | OUTPATIENT
Start: 2022-05-13

## 2022-06-09 DIAGNOSIS — E78.5 HYPERLIPIDEMIA, UNSPECIFIED HYPERLIPIDEMIA TYPE: ICD-10-CM

## 2022-06-09 RX ORDER — ROSUVASTATIN CALCIUM 5 MG/1
TABLET, COATED ORAL
Qty: 90 TABLET | Refills: 0 | Status: SHIPPED | OUTPATIENT
Start: 2022-06-09

## 2022-06-15 RX ORDER — VALACYCLOVIR HYDROCHLORIDE 1 G/1
1000 TABLET, FILM COATED ORAL DAILY
Qty: 90 TABLET | Refills: 0 | OUTPATIENT
Start: 2022-06-15

## 2022-06-15 NOTE — TELEPHONE ENCOUNTER
Sent [facility] to pt stating he will get a denial message bc we have received duplicate request.

## 2022-07-13 ENCOUNTER — TELEMEDICINE (OUTPATIENT)
Dept: INTERNAL MEDICINE CLINIC | Facility: CLINIC | Age: 42
End: 2022-07-13
Payer: COMMERCIAL

## 2022-07-13 DIAGNOSIS — J01.90 ACUTE NON-RECURRENT SINUSITIS, UNSPECIFIED LOCATION: Primary | ICD-10-CM

## 2022-07-13 PROCEDURE — 99213 OFFICE O/P EST LOW 20 MIN: CPT | Performed by: FAMILY MEDICINE

## 2022-07-13 RX ORDER — VALACYCLOVIR HYDROCHLORIDE 1 G/1
1000 TABLET, FILM COATED ORAL DAILY
Qty: 30 TABLET | Refills: 0 | Status: SHIPPED | OUTPATIENT
Start: 2022-07-13

## 2022-07-13 RX ORDER — AZITHROMYCIN 250 MG/1
TABLET, FILM COATED ORAL
Qty: 6 TABLET | Refills: 0 | Status: SHIPPED | OUTPATIENT
Start: 2022-07-13 | End: 2022-07-18

## 2022-08-01 ENCOUNTER — EMPLOYEE HEALTH (OUTPATIENT)
Dept: OTHER | Age: 42
End: 2022-08-01

## 2022-08-05 ENCOUNTER — LAB REQUISITION (OUTPATIENT)
Dept: LAB | Age: 42
End: 2022-08-05

## 2022-08-05 DIAGNOSIS — Z00.00 ENCOUNTER FOR GENERAL ADULT MEDICAL EXAMINATION WITHOUT ABNORMAL FINDINGS: ICD-10-CM

## 2022-08-05 PROCEDURE — PSEU8542 MUMPS IMMUNE ANTIBODY IGG: Performed by: CLINICAL MEDICAL LABORATORY

## 2022-08-05 PROCEDURE — PSEU8571 RUBELLA ANTIBODY IGG: Performed by: CLINICAL MEDICAL LABORATORY

## 2022-08-05 PROCEDURE — 86706 HEP B SURFACE ANTIBODY: CPT | Performed by: CLINICAL MEDICAL LABORATORY

## 2022-08-05 PROCEDURE — 86765 RUBEOLA ANTIBODY: CPT | Performed by: CLINICAL MEDICAL LABORATORY

## 2022-08-05 PROCEDURE — 86787 VARICELLA-ZOSTER ANTIBODY: CPT | Performed by: CLINICAL MEDICAL LABORATORY

## 2022-08-05 PROCEDURE — PSEU8230 VARICELLA ZOSTER IMMUNITY IGG: Performed by: CLINICAL MEDICAL LABORATORY

## 2022-08-05 PROCEDURE — PSEU9049 QUANTIFERON TB PLUS: Performed by: CLINICAL MEDICAL LABORATORY

## 2022-08-05 PROCEDURE — 86735 MUMPS ANTIBODY: CPT | Performed by: CLINICAL MEDICAL LABORATORY

## 2022-08-05 PROCEDURE — PSEU8553 RUBEOLA IMMUNITY IGG: Performed by: CLINICAL MEDICAL LABORATORY

## 2022-08-05 PROCEDURE — 86480 TB TEST CELL IMMUN MEASURE: CPT | Performed by: CLINICAL MEDICAL LABORATORY

## 2022-08-05 PROCEDURE — PSEU8106 HEPATITIS B SURFACE ANTIBODY, QUANTITATIVE: Performed by: CLINICAL MEDICAL LABORATORY

## 2022-08-05 PROCEDURE — 86762 RUBELLA ANTIBODY: CPT | Performed by: CLINICAL MEDICAL LABORATORY

## 2022-08-06 LAB
HBV SURFACE AB SER-ACNC: 23.77 MUNITS/ML
RUBV IGG SERPL IA-ACNC: 307.9 UNITS/ML

## 2022-08-07 LAB
GAMMA INTERFERON BACKGROUND BLD IA-ACNC: 0.03 IU/ML
M TB IFN-G BLD-IMP: NEGATIVE
M TB IFN-G CD4+ BCKGRND COR BLD-ACNC: 0.01 IU/ML
M TB IFN-G CD4+CD8+ BCKGRND COR BLD-ACNC: 0.01 IU/ML
MITOGEN IGNF BCKGRD COR BLD-ACNC: >10 IU/ML

## 2022-08-08 LAB
MEV IGG SER QL IA: NORMAL
MUV IGG SER IA-ACNC: 2.33 OD RATIO
VZV IGG SER IA-ACNC: NORMAL

## 2022-08-15 RX ORDER — LISDEXAMFETAMINE DIMESYLATE CAPSULES 50 MG/1
50 CAPSULE ORAL EVERY MORNING
Qty: 30 CAPSULE | Refills: 0 | Status: SHIPPED | OUTPATIENT
Start: 2022-08-15 | End: 2022-09-07

## 2022-09-07 ENCOUNTER — OFFICE VISIT (OUTPATIENT)
Dept: INTERNAL MEDICINE CLINIC | Facility: CLINIC | Age: 42
End: 2022-09-07
Payer: COMMERCIAL

## 2022-09-07 VITALS
RESPIRATION RATE: 16 BRPM | WEIGHT: 214 LBS | HEIGHT: 68.11 IN | BODY MASS INDEX: 32.43 KG/M2 | SYSTOLIC BLOOD PRESSURE: 108 MMHG | HEART RATE: 86 BPM | TEMPERATURE: 99 F | DIASTOLIC BLOOD PRESSURE: 60 MMHG | OXYGEN SATURATION: 98 %

## 2022-09-07 DIAGNOSIS — B00.9 HERPES: ICD-10-CM

## 2022-09-07 DIAGNOSIS — F98.8 ATTENTION DEFICIT DISORDER (ADD) WITHOUT HYPERACTIVITY: ICD-10-CM

## 2022-09-07 DIAGNOSIS — E78.2 MIXED HYPERLIPIDEMIA: Primary | ICD-10-CM

## 2022-09-07 PROCEDURE — 3074F SYST BP LT 130 MM HG: CPT | Performed by: FAMILY MEDICINE

## 2022-09-07 PROCEDURE — 99213 OFFICE O/P EST LOW 20 MIN: CPT | Performed by: FAMILY MEDICINE

## 2022-09-07 PROCEDURE — 3008F BODY MASS INDEX DOCD: CPT | Performed by: FAMILY MEDICINE

## 2022-09-07 PROCEDURE — 3078F DIAST BP <80 MM HG: CPT | Performed by: FAMILY MEDICINE

## 2022-09-07 RX ORDER — VALACYCLOVIR HYDROCHLORIDE 1 G/1
1000 TABLET, FILM COATED ORAL DAILY
Qty: 30 TABLET | Refills: 0 | Status: SHIPPED | OUTPATIENT
Start: 2022-09-07

## 2022-09-07 RX ORDER — ROSUVASTATIN CALCIUM 5 MG/1
5 TABLET, COATED ORAL NIGHTLY
Qty: 90 TABLET | Refills: 0 | Status: SHIPPED | OUTPATIENT
Start: 2022-09-07

## 2022-09-13 DIAGNOSIS — F98.8 ATTENTION DEFICIT DISORDER (ADD) WITHOUT HYPERACTIVITY: ICD-10-CM

## 2022-10-16 DIAGNOSIS — F98.8 ATTENTION DEFICIT DISORDER (ADD) WITHOUT HYPERACTIVITY: ICD-10-CM

## 2022-10-16 DIAGNOSIS — B00.9 HERPES: ICD-10-CM

## 2022-10-16 RX ORDER — VALACYCLOVIR HYDROCHLORIDE 1 G/1
1000 TABLET, FILM COATED ORAL DAILY
Qty: 30 TABLET | Refills: 0 | Status: SHIPPED | OUTPATIENT
Start: 2022-10-16

## 2022-11-19 DIAGNOSIS — F98.8 ATTENTION DEFICIT DISORDER (ADD) WITHOUT HYPERACTIVITY: ICD-10-CM

## 2022-11-26 ENCOUNTER — E-VISIT (OUTPATIENT)
Dept: TELEHEALTH | Age: 42
End: 2022-11-26

## 2022-11-26 DIAGNOSIS — Z02.9 ADMINISTRATIVE ENCOUNTER: Primary | ICD-10-CM

## 2022-12-22 DIAGNOSIS — F98.8 ATTENTION DEFICIT DISORDER (ADD) WITHOUT HYPERACTIVITY: ICD-10-CM

## 2023-01-09 ENCOUNTER — OFFICE VISIT (OUTPATIENT)
Dept: INTERNAL MEDICINE CLINIC | Facility: CLINIC | Age: 43
End: 2023-01-09
Payer: COMMERCIAL

## 2023-01-09 ENCOUNTER — LAB ENCOUNTER (OUTPATIENT)
Dept: LAB | Age: 43
End: 2023-01-09
Attending: FAMILY MEDICINE
Payer: COMMERCIAL

## 2023-01-09 VITALS
TEMPERATURE: 99 F | BODY MASS INDEX: 32.58 KG/M2 | HEART RATE: 96 BPM | SYSTOLIC BLOOD PRESSURE: 126 MMHG | OXYGEN SATURATION: 97 % | WEIGHT: 215 LBS | HEIGHT: 68.11 IN | DIASTOLIC BLOOD PRESSURE: 70 MMHG | RESPIRATION RATE: 14 BRPM

## 2023-01-09 DIAGNOSIS — Z00.00 ROUTINE GENERAL MEDICAL EXAMINATION AT A HEALTH CARE FACILITY: ICD-10-CM

## 2023-01-09 DIAGNOSIS — Z00.00 ROUTINE GENERAL MEDICAL EXAMINATION AT A HEALTH CARE FACILITY: Primary | ICD-10-CM

## 2023-01-09 LAB
ALBUMIN SERPL-MCNC: 4 G/DL (ref 3.4–5)
ALBUMIN/GLOB SERPL: 1.1 {RATIO} (ref 1–2)
ALP LIVER SERPL-CCNC: 64 U/L
ALT SERPL-CCNC: 32 U/L
ANION GAP SERPL CALC-SCNC: 9 MMOL/L (ref 0–18)
AST SERPL-CCNC: 25 U/L (ref 15–37)
BASOPHILS # BLD AUTO: 0.07 X10(3) UL (ref 0–0.2)
BASOPHILS NFR BLD AUTO: 0.7 %
BILIRUB SERPL-MCNC: 0.5 MG/DL (ref 0.1–2)
BUN BLD-MCNC: 22 MG/DL (ref 7–18)
CALCIUM BLD-MCNC: 9.4 MG/DL (ref 8.5–10.1)
CHLORIDE SERPL-SCNC: 107 MMOL/L (ref 98–112)
CHOLEST SERPL-MCNC: 229 MG/DL (ref ?–200)
CO2 SERPL-SCNC: 27 MMOL/L (ref 21–32)
CREAT BLD-MCNC: 1.01 MG/DL
EOSINOPHIL # BLD AUTO: 0.05 X10(3) UL (ref 0–0.7)
EOSINOPHIL NFR BLD AUTO: 0.5 %
ERYTHROCYTE [DISTWIDTH] IN BLOOD BY AUTOMATED COUNT: 12.9 %
EST. AVERAGE GLUCOSE BLD GHB EST-MCNC: 114 MG/DL (ref 68–126)
FASTING PATIENT LIPID ANSWER: YES
FASTING STATUS PATIENT QL REPORTED: YES
GFR SERPLBLD BASED ON 1.73 SQ M-ARVRAT: 96 ML/MIN/1.73M2 (ref 60–?)
GLOBULIN PLAS-MCNC: 3.6 G/DL (ref 2.8–4.4)
GLUCOSE BLD-MCNC: 98 MG/DL (ref 70–99)
HBA1C MFR BLD: 5.6 % (ref ?–5.7)
HCT VFR BLD AUTO: 40.9 %
HDLC SERPL-MCNC: 66 MG/DL (ref 40–59)
HGB BLD-MCNC: 13.5 G/DL
IMM GRANULOCYTES # BLD AUTO: 0.03 X10(3) UL (ref 0–1)
IMM GRANULOCYTES NFR BLD: 0.3 %
LDLC SERPL CALC-MCNC: 158 MG/DL (ref ?–100)
LYMPHOCYTES # BLD AUTO: 2.24 X10(3) UL (ref 1–4)
LYMPHOCYTES NFR BLD AUTO: 22 %
MCH RBC QN AUTO: 30.4 PG (ref 26–34)
MCHC RBC AUTO-ENTMCNC: 33 G/DL (ref 31–37)
MCV RBC AUTO: 92.1 FL
MONOCYTES # BLD AUTO: 0.53 X10(3) UL (ref 0.1–1)
MONOCYTES NFR BLD AUTO: 5.2 %
NEUTROPHILS # BLD AUTO: 7.27 X10 (3) UL (ref 1.5–7.7)
NEUTROPHILS # BLD AUTO: 7.27 X10(3) UL (ref 1.5–7.7)
NEUTROPHILS NFR BLD AUTO: 71.3 %
NONHDLC SERPL-MCNC: 163 MG/DL (ref ?–130)
OSMOLALITY SERPL CALC.SUM OF ELEC: 299 MOSM/KG (ref 275–295)
PLATELET # BLD AUTO: 356 10(3)UL (ref 150–450)
POTASSIUM SERPL-SCNC: 4.5 MMOL/L (ref 3.5–5.1)
PROT SERPL-MCNC: 7.6 G/DL (ref 6.4–8.2)
RBC # BLD AUTO: 4.44 X10(6)UL
SODIUM SERPL-SCNC: 143 MMOL/L (ref 136–145)
TRIGL SERPL-MCNC: 30 MG/DL (ref 30–149)
TSI SER-ACNC: 1.42 MIU/ML (ref 0.36–3.74)
VLDLC SERPL CALC-MCNC: 6 MG/DL (ref 0–30)
WBC # BLD AUTO: 10.2 X10(3) UL (ref 4–11)

## 2023-01-09 PROCEDURE — 99396 PREV VISIT EST AGE 40-64: CPT | Performed by: FAMILY MEDICINE

## 2023-01-09 PROCEDURE — 83036 HEMOGLOBIN GLYCOSYLATED A1C: CPT | Performed by: FAMILY MEDICINE

## 2023-01-09 PROCEDURE — 80061 LIPID PANEL: CPT | Performed by: FAMILY MEDICINE

## 2023-01-09 PROCEDURE — 3074F SYST BP LT 130 MM HG: CPT | Performed by: FAMILY MEDICINE

## 2023-01-09 PROCEDURE — 80050 GENERAL HEALTH PANEL: CPT | Performed by: FAMILY MEDICINE

## 2023-01-09 PROCEDURE — 3078F DIAST BP <80 MM HG: CPT | Performed by: FAMILY MEDICINE

## 2023-01-09 PROCEDURE — 3008F BODY MASS INDEX DOCD: CPT | Performed by: FAMILY MEDICINE

## 2023-01-16 ENCOUNTER — TELEPHONE (OUTPATIENT)
Dept: INTERNAL MEDICINE CLINIC | Facility: CLINIC | Age: 43
End: 2023-01-16

## 2023-01-16 DIAGNOSIS — E78.2 MIXED HYPERLIPIDEMIA: Primary | ICD-10-CM

## 2023-01-16 RX ORDER — ATORVASTATIN CALCIUM 10 MG/1
10 TABLET, FILM COATED ORAL DAILY
Qty: 90 TABLET | Refills: 0 | Status: SHIPPED | OUTPATIENT
Start: 2023-01-16

## 2023-01-16 NOTE — TELEPHONE ENCOUNTER
----- Message from TEAGAN Reese sent at 1/10/2023  9:03 AM CST -----  BUN up, Pt to increase fluids. Chol, LDL and non Hdl all up. Pt's lipids have been up for at least the past 4 years. Pt would benefit from a chol med. Lipitor 10 mg one daily #90, recheck Lipids in 3 months. Rest of labs ok.

## 2023-02-19 DIAGNOSIS — B00.9 HERPES: ICD-10-CM

## 2023-02-19 DIAGNOSIS — E78.2 MIXED HYPERLIPIDEMIA: ICD-10-CM

## 2023-02-19 DIAGNOSIS — F98.8 ATTENTION DEFICIT DISORDER (ADD) WITHOUT HYPERACTIVITY: ICD-10-CM

## 2023-02-20 RX ORDER — VALACYCLOVIR HYDROCHLORIDE 1 G/1
1000 TABLET, FILM COATED ORAL DAILY
Qty: 30 TABLET | Refills: 0 | Status: SHIPPED | OUTPATIENT
Start: 2023-02-20

## 2023-02-20 RX ORDER — ATORVASTATIN CALCIUM 10 MG/1
10 TABLET, FILM COATED ORAL DAILY
Qty: 90 TABLET | Refills: 0 | OUTPATIENT
Start: 2023-02-20

## 2023-02-20 NOTE — TELEPHONE ENCOUNTER
Vyvanse 50 mg  Filled 1-19-23  Qty 30  0 refills  No upcoming appt. LOV 1-9-23   RTC 1 yr    Atorvastatin 10 mg- too early for refill, needs repeat labs done in March  filled 1-16-23  Qty 90  0 refills  No upcoming appt. LOV 1-9-23   RTC 1 yr  Labs: 1-9-23 Lipid    Valacyclovir 1 g  Filled 12-19-22  Qty 30  0 refills  No upcoming appt.   LOV 1-9-23   RTC 1 yr

## 2023-04-21 DIAGNOSIS — F98.8 ATTENTION DEFICIT DISORDER (ADD) WITHOUT HYPERACTIVITY: ICD-10-CM

## 2023-04-21 DIAGNOSIS — B00.9 HERPES: ICD-10-CM

## 2023-04-21 RX ORDER — VALACYCLOVIR HYDROCHLORIDE 1 G/1
1000 TABLET, FILM COATED ORAL DAILY
Qty: 30 TABLET | Refills: 2 | Status: SHIPPED | OUTPATIENT
Start: 2023-04-21

## 2023-05-21 DIAGNOSIS — F98.8 ATTENTION DEFICIT DISORDER (ADD) WITHOUT HYPERACTIVITY: ICD-10-CM

## 2023-06-20 DIAGNOSIS — F98.8 ATTENTION DEFICIT DISORDER (ADD) WITHOUT HYPERACTIVITY: ICD-10-CM

## 2023-06-20 DIAGNOSIS — E78.2 MIXED HYPERLIPIDEMIA: ICD-10-CM

## 2023-06-21 RX ORDER — ATORVASTATIN CALCIUM 10 MG/1
10 TABLET, FILM COATED ORAL DAILY
Qty: 90 TABLET | Refills: 0 | Status: SHIPPED | OUTPATIENT
Start: 2023-06-21

## 2023-07-25 DIAGNOSIS — F98.8 ATTENTION DEFICIT DISORDER (ADD) WITHOUT HYPERACTIVITY: ICD-10-CM

## 2023-07-26 NOTE — TELEPHONE ENCOUNTER
[facility] sent to pt to schedule appt. Vyvanse 50 mg  Filled 6-21-23  Qty 30  0 refills  No upcoming appt.   LOV 1-9-23 SM

## 2023-08-25 DIAGNOSIS — B00.9 HERPES: ICD-10-CM

## 2023-08-25 DIAGNOSIS — F98.8 ATTENTION DEFICIT DISORDER (ADD) WITHOUT HYPERACTIVITY: ICD-10-CM

## 2023-08-26 RX ORDER — VALACYCLOVIR HYDROCHLORIDE 1 G/1
1000 TABLET, FILM COATED ORAL DAILY
Qty: 30 TABLET | Refills: 2 | Status: SHIPPED | OUTPATIENT
Start: 2023-08-26

## 2023-09-23 DIAGNOSIS — B00.9 HERPES: ICD-10-CM

## 2023-09-23 DIAGNOSIS — F98.8 ATTENTION DEFICIT DISORDER (ADD) WITHOUT HYPERACTIVITY: ICD-10-CM

## 2023-09-24 RX ORDER — VALACYCLOVIR HYDROCHLORIDE 1 G/1
1000 TABLET, FILM COATED ORAL DAILY
Qty: 30 TABLET | Refills: 2 | Status: SHIPPED | OUTPATIENT
Start: 2023-09-24

## 2023-10-24 DIAGNOSIS — F98.8 ATTENTION DEFICIT DISORDER (ADD) WITHOUT HYPERACTIVITY: ICD-10-CM

## 2023-10-24 DIAGNOSIS — B00.9 HERPES: ICD-10-CM

## 2023-10-24 RX ORDER — LISDEXAMFETAMINE DIMESYLATE CAPSULES 50 MG/1
50 CAPSULE ORAL EVERY MORNING
Qty: 30 CAPSULE | Refills: 0 | Status: SHIPPED | OUTPATIENT
Start: 2023-10-24

## 2023-10-24 RX ORDER — VALACYCLOVIR HYDROCHLORIDE 1 G/1
1000 TABLET, FILM COATED ORAL DAILY
Qty: 30 TABLET | Refills: 2 | Status: SHIPPED | OUTPATIENT
Start: 2023-10-24

## 2023-11-24 DIAGNOSIS — B00.9 HERPES: ICD-10-CM

## 2023-11-24 DIAGNOSIS — F98.8 ATTENTION DEFICIT DISORDER (ADD) WITHOUT HYPERACTIVITY: ICD-10-CM

## 2023-11-24 DIAGNOSIS — E78.2 MIXED HYPERLIPIDEMIA: ICD-10-CM

## 2023-11-24 NOTE — TELEPHONE ENCOUNTER
Requesting    atorvastatin (LIPITOR) 10 MG Oral Tab         Sig: Take 1 tablet (10 mg total) by mouth daily. COMPLETE LAB WORK ASAP FOR FURTHER REFILLS    Disp: 90 tablet    Refills: 0    Start: 11/24/2023    Class: Normal    Non-formulary For: Mixed hyperlipidemia    Last ordered: 5 months ago (6/21/2023) by TEAGAN Abebe    Cholesterol Medication Protocol [id number]/24/2023 11:03 AM   Protocol Details ALT < 80    ALT resulted within past year    Lipid panel within past 12 months    Appointment within past 12 or next 3 months       valACYclovir 1 G Oral Tab         Sig: Take 1 tablet (1,000 mg total) by mouth daily. Disp: 30 tablet    Refills: 2    Start: 11/24/2023    Class: Normal    Non-formulary For: Herpes    Last ordered: 1 month ago (10/24/2023) by TEAGAN Abebe    Herpes Agent Protocol [id number]/24/2023 11:03 AM    Appointment in the past 12 or next 3 months       lisdexamfetamine (VYVANSE) 50 MG Oral Cap         Sig: Take 1 capsule (50 mg total) by mouth every morning. Disp: 30 capsule    Refills: 0    Start: 11/24/2023    Earliest Fill Date: 11/24/2023    Class: Normal    Non-formulary For: Attention deficit disorder (ADD) without hyperactivity    Last ordered: 1 month ago (10/24/2023) by TEAGAN Abebe     LOV: 7/26/2023  RTC: none noted   Last Relevant Labs: 1/9/2023  Filled:   Valacyclovir-10/24/2023 #30 with 2 refills  Vyvanse-10/24/2023 #30 with 0 refills  Atorvastatin--6/21/2023 #90 with 0 refills  No future appointments.

## 2023-11-26 DIAGNOSIS — E78.2 MIXED HYPERLIPIDEMIA: ICD-10-CM

## 2023-11-26 RX ORDER — VALACYCLOVIR HYDROCHLORIDE 1 G/1
1000 TABLET, FILM COATED ORAL DAILY
Qty: 30 TABLET | Refills: 2 | Status: SHIPPED | OUTPATIENT
Start: 2023-11-26

## 2023-11-26 RX ORDER — ATORVASTATIN CALCIUM 10 MG/1
TABLET, FILM COATED ORAL
Qty: 90 TABLET | Refills: 0 | OUTPATIENT
Start: 2023-11-26

## 2023-11-26 RX ORDER — ATORVASTATIN CALCIUM 10 MG/1
10 TABLET, FILM COATED ORAL DAILY
Qty: 30 TABLET | Refills: 0 | Status: SHIPPED | OUTPATIENT
Start: 2023-11-26

## 2023-11-26 RX ORDER — LISDEXAMFETAMINE DIMESYLATE CAPSULES 50 MG/1
50 CAPSULE ORAL EVERY MORNING
Qty: 30 CAPSULE | Refills: 0 | Status: SHIPPED | OUTPATIENT
Start: 2023-11-26

## 2023-12-13 ENCOUNTER — LAB ENCOUNTER (OUTPATIENT)
Dept: LAB | Age: 43
End: 2023-12-13
Attending: FAMILY MEDICINE
Payer: COMMERCIAL

## 2023-12-13 DIAGNOSIS — E78.2 MIXED HYPERLIPIDEMIA: ICD-10-CM

## 2023-12-13 LAB
CHOLEST SERPL-MCNC: 200 MG/DL (ref ?–200)
FASTING PATIENT LIPID ANSWER: YES
HDLC SERPL-MCNC: 63 MG/DL (ref 40–59)
LDLC SERPL CALC-MCNC: 129 MG/DL (ref ?–100)
NONHDLC SERPL-MCNC: 137 MG/DL (ref ?–130)
TRIGL SERPL-MCNC: 42 MG/DL (ref 30–149)
VLDLC SERPL CALC-MCNC: 7 MG/DL (ref 0–30)

## 2023-12-13 PROCEDURE — 80061 LIPID PANEL: CPT | Performed by: FAMILY MEDICINE

## 2023-12-24 DIAGNOSIS — E78.2 MIXED HYPERLIPIDEMIA: ICD-10-CM

## 2023-12-24 DIAGNOSIS — F98.8 ATTENTION DEFICIT DISORDER (ADD) WITHOUT HYPERACTIVITY: ICD-10-CM

## 2023-12-24 DIAGNOSIS — B00.9 HERPES: ICD-10-CM

## 2023-12-25 RX ORDER — LISDEXAMFETAMINE DIMESYLATE CAPSULES 50 MG/1
50 CAPSULE ORAL EVERY MORNING
Qty: 30 CAPSULE | Refills: 0 | Status: SHIPPED | OUTPATIENT
Start: 2023-12-25

## 2023-12-25 RX ORDER — ATORVASTATIN CALCIUM 10 MG/1
10 TABLET, FILM COATED ORAL DAILY
Qty: 30 TABLET | Refills: 0 | Status: SHIPPED | OUTPATIENT
Start: 2023-12-25

## 2023-12-25 RX ORDER — VALACYCLOVIR HYDROCHLORIDE 1 G/1
1000 TABLET, FILM COATED ORAL DAILY
Qty: 30 TABLET | Refills: 0 | Status: SHIPPED | OUTPATIENT
Start: 2023-12-25

## 2023-12-26 DIAGNOSIS — E78.2 MIXED HYPERLIPIDEMIA: ICD-10-CM

## 2023-12-26 RX ORDER — ATORVASTATIN CALCIUM 10 MG/1
10 TABLET, FILM COATED ORAL DAILY
Qty: 90 TABLET | Refills: 0 | Status: SHIPPED | OUTPATIENT
Start: 2023-12-26

## 2024-01-30 ENCOUNTER — OFFICE VISIT (OUTPATIENT)
Dept: INTERNAL MEDICINE CLINIC | Facility: CLINIC | Age: 44
End: 2024-01-30
Payer: COMMERCIAL

## 2024-01-30 VITALS
BODY MASS INDEX: 32.34 KG/M2 | OXYGEN SATURATION: 96 % | DIASTOLIC BLOOD PRESSURE: 74 MMHG | RESPIRATION RATE: 16 BRPM | WEIGHT: 213.38 LBS | HEIGHT: 68.11 IN | HEART RATE: 96 BPM | SYSTOLIC BLOOD PRESSURE: 116 MMHG

## 2024-01-30 DIAGNOSIS — Z00.00 ROUTINE GENERAL MEDICAL EXAMINATION AT A HEALTH CARE FACILITY: Primary | ICD-10-CM

## 2024-01-30 DIAGNOSIS — F98.8 ATTENTION DEFICIT DISORDER (ADD) WITHOUT HYPERACTIVITY: ICD-10-CM

## 2024-01-30 DIAGNOSIS — E78.2 MIXED HYPERLIPIDEMIA: ICD-10-CM

## 2024-01-30 PROCEDURE — 99396 PREV VISIT EST AGE 40-64: CPT | Performed by: FAMILY MEDICINE

## 2024-01-30 PROCEDURE — 3008F BODY MASS INDEX DOCD: CPT | Performed by: FAMILY MEDICINE

## 2024-01-30 PROCEDURE — 3078F DIAST BP <80 MM HG: CPT | Performed by: FAMILY MEDICINE

## 2024-01-30 PROCEDURE — 3074F SYST BP LT 130 MM HG: CPT | Performed by: FAMILY MEDICINE

## 2024-01-30 PROCEDURE — 99212 OFFICE O/P EST SF 10 MIN: CPT | Performed by: FAMILY MEDICINE

## 2024-01-30 RX ORDER — LISDEXAMFETAMINE DIMESYLATE CAPSULES 50 MG/1
50 CAPSULE ORAL EVERY MORNING
Qty: 30 CAPSULE | Refills: 0 | Status: SHIPPED | OUTPATIENT
Start: 2024-01-30

## 2024-01-30 NOTE — PROGRESS NOTES
Johny Estrada is a 43 year old male who presents for a complete physical exam.   HPI:   Pt complains of none.  Screening:  Tdap - 1/4/20  Labs- 1/9/23  PSA- n/a  Colon- n/a   Dexa Scan over age 70 yr: n/a  Family Hx: Prostate cancer none, Colon cancer none  Flu shot- 11/15/23  Glasses/contacts- yes,last exam- 7/2023  Dental visits - 8/2023    Patient presents for recheck of his ADHD.  Patient has been using the stimulant medication, Vyvanse, on a regular basis. Patient was last seen 6 months ago.  Medication changes at that time:  none.  Patient feels the medication continues to improved his focus.  Pt has been taking medications as instructed, no medication side effects.  Patient's appetite is good. Patient denies headaches, tics or insomnia.  Patient denies any chest pain, palpitations, or dyspnea.  Patient denies any depressive symptoms or anxiety.     Pt also here for a recheck on  hyperlipidemia.   Currently asymptomatic, no chest pains, jaw pains, arm pains. No skin lesions.  No SOB on exertion or rest.  Patient denies any myalgias or arthralgias on the medication. Pt has been following a low fat diet and has been exercising 5-7 days a week.  Current lipid treatment: lipitor    Wt Readings from Last 6 Encounters:   01/30/24 213 lb 6.4 oz (96.8 kg)   07/26/23 216 lb (98 kg)   01/09/23 215 lb (97.5 kg)   09/07/22 214 lb (97.1 kg)   03/17/22 215 lb (97.5 kg)   11/15/21 217 lb (98.4 kg)     Body mass index is 32.34 kg/m².     Lab Results   Component Value Date    GLU 98 01/09/2023    GLU 97 05/28/2021    GLU 85 12/13/2018     Lab Results   Component Value Date    CHOLEST 200 (H) 12/13/2023    CHOLEST 229 (H) 01/09/2023    CHOLEST 221 12/16/2021     Lab Results   Component Value Date    HDL 63 (H) 12/13/2023    HDL 66 (H) 01/09/2023    HDL 64 12/16/2021     Lab Results   Component Value Date     (H) 12/13/2023     (H) 01/09/2023     12/16/2021     Lab Results   Component Value Date    AST  25 01/09/2023    AST 21 12/16/2021    AST 35 12/13/2018     Lab Results   Component Value Date    ALT 32 01/09/2023    ALT 23 12/16/2021    ALT 42 12/13/2018     No results found for: \"PSA\"     Current Outpatient Medications   Medication Sig Dispense Refill    atorvastatin 10 MG Oral Tab Take 1 tablet (10 mg total) by mouth daily. PX/LABS DUE 90 tablet 0    valACYclovir 1 G Oral Tab Take 1 tablet (1,000 mg total) by mouth daily. 30 tablet 0    lisdexamfetamine (VYVANSE) 50 MG Oral Cap Take 1 capsule (50 mg total) by mouth every morning. 30 capsule 0      Past Medical History:   Diagnosis Date    ADHD (attention deficit hyperactivity disorder)       Past Surgical History:   Procedure Laterality Date    APPENDECTOMY      BREAST SURGERY PROCEDURE UNLISTED      B/L breast reduction procedure      Family History   Problem Relation Age of Onset    Diabetes Other       Social History:  Social History     Socioeconomic History    Marital status:    Tobacco Use    Smoking status: Never    Smokeless tobacco: Never   Vaping Use    Vaping Use: Never used   Substance and Sexual Activity    Alcohol use: Yes     Comment: social    Drug use: No    Sexual activity: Yes     Partners: Female   Other Topics Concern    Caffeine Concern Yes     Comment: Pre-workout daily.     Exercise Yes     Comment: 6 days/week       Occ: . : yes. Children: 2.   Exercise:  5-7 times per week.  Diet: low carb diet     REVIEW OF SYSTEMS:   GENERAL: feels well otherwise  SKIN: denies any unusual skin lesions  EYES:denies blurred vision or double vision  HEENT: denies nasal congestion, sinus pain or ST  LUNGS: denies shortness of breath with exertion  CARDIOVASCULAR: denies chest pain on exertion  GI: denies abdominal pain,denies heartburn  : denies nocturia or changes in stream,+herpes  MUSCULOSKELETAL:hx lumbar back pain  NEURO: denies headaches  PSYCHE: denies depression or anxiety,+ADD  HEMATOLOGIC: denies hx of  anemia  ENDOCRINE: denies thyroid history  ALL/ASTHMA: denies hx of allergy or asthma    EXAM:   /74 (BP Location: Left arm, Patient Position: Sitting, Cuff Size: large)   Pulse 96   Resp 16   Ht 5' 8.11\" (1.73 m)   Wt 213 lb 6.4 oz (96.8 kg)   SpO2 96%   BMI 32.34 kg/m²   Body mass index is 32.34 kg/m².   GENERAL: well developed, well nourished,in no apparent distress  SKIN: no rashes,no suspicious lesions  HEENT: atraumatic, normocephalic,ears and throat are clear  EYES:PERRLA, EOMI, normal optic disk,conjunctiva are clear  NECK: supple,no adenopathy,no bruits  CHEST: no chest tenderness  BREAST: no dominant or suspicious mass  LUNGS: clear to auscultation  CARDIO: RRR without murmur  GI: good BS's,no masses, HSM or tenderness  : no masses,no hernia  RECTAL: deferred  MUSCULOSKELETAL: back is not tender,FROM of the back  EXTREMITIES: no cyanosis, clubbing or edema  NEURO: Oriented times three,cranial nerves are intact,motor and sensory are grossly intact    ASSESSMENT AND PLAN:   Johny Estrada is a 43 year old male who presents for a complete physical exam.  Pt's weight is Body mass index is 32.34 kg/m²., recommended low fat diet and aerobic exercise 30 minutes three times weekly. Health maintenance, will check fasting Labs. The patient indicates understanding of these issues and agrees to the plan.  The patient is asked to return for CPX in one year.  1. Routine general medical examination at a health care facility    - CBC With Differential With Platelet; Future  - Comp Metabolic Panel (14); Future  - Hemoglobin A1C; Future  - TSH W Reflex To Free T4; Future    2. Attention deficit disorder (ADD) without hyperactivity  Patient is willing to continue stimulant type medication treatment for this condition. Pt stable on medication, medication refilled.  - lisdexamfetamine (VYVANSE) 50 MG Oral Cap; Take 1 capsule (50 mg total) by mouth every morning.  Dispense: 30 capsule; Refill: 0    3. Mixed  hyperlipidemia  Pt to continue their medication, increase exercise,  choose better fats,  increase fiber and avoid processed foods.  Medication refilled earlier this AM.        Encounter Diagnoses   Name Primary?    Routine general medical examination at a health care facility Yes    Attention deficit disorder (ADD) without hyperactivity        Orders Placed This Encounter   Procedures    CBC With Differential With Platelet    Comp Metabolic Panel (14)    Hemoglobin A1C    TSH W Reflex To Free T4       Meds & Refills for this Visit:  Requested Prescriptions     Signed Prescriptions Disp Refills    lisdexamfetamine (VYVANSE) 50 MG Oral Cap 30 capsule 0     Sig: Take 1 capsule (50 mg total) by mouth every morning.       Imaging & Consults:  None

## 2024-02-23 ENCOUNTER — LAB ENCOUNTER (OUTPATIENT)
Dept: LAB | Age: 44
End: 2024-02-23
Attending: FAMILY MEDICINE
Payer: COMMERCIAL

## 2024-02-23 DIAGNOSIS — Z00.00 ROUTINE GENERAL MEDICAL EXAMINATION AT A HEALTH CARE FACILITY: ICD-10-CM

## 2024-02-23 LAB
ALBUMIN SERPL-MCNC: 3.9 G/DL (ref 3.4–5)
ALBUMIN/GLOB SERPL: 1.1 {RATIO} (ref 1–2)
ALP LIVER SERPL-CCNC: 54 U/L
ALT SERPL-CCNC: 44 U/L
ANION GAP SERPL CALC-SCNC: 7 MMOL/L (ref 0–18)
AST SERPL-CCNC: 30 U/L (ref 15–37)
BASOPHILS # BLD AUTO: 0.05 X10(3) UL (ref 0–0.2)
BASOPHILS NFR BLD AUTO: 0.5 %
BILIRUB SERPL-MCNC: 0.7 MG/DL (ref 0.1–2)
BUN BLD-MCNC: 24 MG/DL (ref 9–23)
CALCIUM BLD-MCNC: 9.6 MG/DL (ref 8.5–10.1)
CHLORIDE SERPL-SCNC: 106 MMOL/L (ref 98–112)
CO2 SERPL-SCNC: 25 MMOL/L (ref 21–32)
CREAT BLD-MCNC: 0.88 MG/DL
EGFRCR SERPLBLD CKD-EPI 2021: 110 ML/MIN/1.73M2 (ref 60–?)
EOSINOPHIL # BLD AUTO: 0.06 X10(3) UL (ref 0–0.7)
EOSINOPHIL NFR BLD AUTO: 0.6 %
ERYTHROCYTE [DISTWIDTH] IN BLOOD BY AUTOMATED COUNT: 12.3 %
EST. AVERAGE GLUCOSE BLD GHB EST-MCNC: 123 MG/DL (ref 68–126)
FASTING STATUS PATIENT QL REPORTED: YES
GLOBULIN PLAS-MCNC: 3.7 G/DL (ref 2.8–4.4)
GLUCOSE BLD-MCNC: 90 MG/DL (ref 70–99)
HBA1C MFR BLD: 5.9 % (ref ?–5.7)
HCT VFR BLD AUTO: 39.4 %
HGB BLD-MCNC: 13.1 G/DL
IMM GRANULOCYTES # BLD AUTO: 0.02 X10(3) UL (ref 0–1)
IMM GRANULOCYTES NFR BLD: 0.2 %
LYMPHOCYTES # BLD AUTO: 2.18 X10(3) UL (ref 1–4)
LYMPHOCYTES NFR BLD AUTO: 21.8 %
MCH RBC QN AUTO: 30 PG (ref 26–34)
MCHC RBC AUTO-ENTMCNC: 33.2 G/DL (ref 31–37)
MCV RBC AUTO: 90.4 FL
MONOCYTES # BLD AUTO: 0.68 X10(3) UL (ref 0.1–1)
MONOCYTES NFR BLD AUTO: 6.8 %
NEUTROPHILS # BLD AUTO: 7.03 X10 (3) UL (ref 1.5–7.7)
NEUTROPHILS # BLD AUTO: 7.03 X10(3) UL (ref 1.5–7.7)
NEUTROPHILS NFR BLD AUTO: 70.1 %
OSMOLALITY SERPL CALC.SUM OF ELEC: 290 MOSM/KG (ref 275–295)
PLATELET # BLD AUTO: 432 10(3)UL (ref 150–450)
POTASSIUM SERPL-SCNC: 3.8 MMOL/L (ref 3.5–5.1)
PROT SERPL-MCNC: 7.6 G/DL (ref 6.4–8.2)
RBC # BLD AUTO: 4.36 X10(6)UL
SODIUM SERPL-SCNC: 138 MMOL/L (ref 136–145)
TSI SER-ACNC: 1.34 MIU/ML (ref 0.36–3.74)
WBC # BLD AUTO: 10 X10(3) UL (ref 4–11)

## 2024-02-23 PROCEDURE — 83036 HEMOGLOBIN GLYCOSYLATED A1C: CPT

## 2024-02-23 PROCEDURE — 80053 COMPREHEN METABOLIC PANEL: CPT

## 2024-02-23 PROCEDURE — 84443 ASSAY THYROID STIM HORMONE: CPT

## 2024-02-23 PROCEDURE — 85025 COMPLETE CBC W/AUTO DIFF WBC: CPT

## 2024-02-26 DIAGNOSIS — E78.2 MIXED HYPERLIPIDEMIA: ICD-10-CM

## 2024-02-26 DIAGNOSIS — B00.9 HERPES: ICD-10-CM

## 2024-02-26 DIAGNOSIS — F98.8 ATTENTION DEFICIT DISORDER (ADD) WITHOUT HYPERACTIVITY: ICD-10-CM

## 2024-02-26 RX ORDER — VALACYCLOVIR HYDROCHLORIDE 1 G/1
1000 TABLET, FILM COATED ORAL DAILY
Qty: 30 TABLET | Refills: 0 | Status: SHIPPED | OUTPATIENT
Start: 2024-02-26

## 2024-02-26 RX ORDER — LISDEXAMFETAMINE DIMESYLATE CAPSULES 50 MG/1
50 CAPSULE ORAL EVERY MORNING
Qty: 30 CAPSULE | Refills: 0 | Status: SHIPPED | OUTPATIENT
Start: 2024-02-26

## 2024-02-26 RX ORDER — ATORVASTATIN CALCIUM 10 MG/1
10 TABLET, FILM COATED ORAL DAILY
Qty: 90 TABLET | Refills: 0 | Status: SHIPPED | OUTPATIENT
Start: 2024-02-26

## 2024-02-26 NOTE — TELEPHONE ENCOUNTER
Atorvastatin 10 mg  Filled 1-30-24  Qty 90  0 refills  No future appointments.  LOV 1-30-24 SM  Labs 12-13-23 Lipid    Vyvanse 50 mg  Filled 1-30-24  Qty 30  0 refills  No future appointments.  LOV 1-30-24 SM    Valcyclovir 1 g  Filled 12-25-23  Qty 30  0 refills  No future appointments.  LOV 1-30-24 SM

## 2024-03-11 ENCOUNTER — TELEPHONE (OUTPATIENT)
Dept: INTERNAL MEDICINE CLINIC | Facility: CLINIC | Age: 44
End: 2024-03-11

## 2024-03-11 DIAGNOSIS — R73.09 ELEVATED HEMOGLOBIN A1C: ICD-10-CM

## 2024-03-11 DIAGNOSIS — R73.01 IMPAIRED FASTING GLUCOSE: Primary | ICD-10-CM

## 2024-03-27 DIAGNOSIS — B00.9 HERPES: ICD-10-CM

## 2024-03-27 DIAGNOSIS — F98.8 ATTENTION DEFICIT DISORDER (ADD) WITHOUT HYPERACTIVITY: ICD-10-CM

## 2024-03-27 RX ORDER — VALACYCLOVIR HYDROCHLORIDE 1 G/1
1000 TABLET, FILM COATED ORAL DAILY
Qty: 30 TABLET | Refills: 3 | Status: SHIPPED | OUTPATIENT
Start: 2024-03-27

## 2024-03-27 RX ORDER — LISDEXAMFETAMINE DIMESYLATE CAPSULES 50 MG/1
50 CAPSULE ORAL EVERY MORNING
Qty: 30 CAPSULE | Refills: 0 | Status: SHIPPED | OUTPATIENT
Start: 2024-03-27

## 2024-04-22 DIAGNOSIS — B00.9 HERPES: ICD-10-CM

## 2024-04-22 DIAGNOSIS — F98.8 ATTENTION DEFICIT DISORDER (ADD) WITHOUT HYPERACTIVITY: ICD-10-CM

## 2024-04-22 DIAGNOSIS — E78.2 MIXED HYPERLIPIDEMIA: ICD-10-CM

## 2024-04-23 RX ORDER — ATORVASTATIN CALCIUM 10 MG/1
10 TABLET, FILM COATED ORAL DAILY
Qty: 90 TABLET | Refills: 0 | Status: SHIPPED | OUTPATIENT
Start: 2024-04-23

## 2024-04-23 RX ORDER — VALACYCLOVIR HYDROCHLORIDE 1 G/1
1000 TABLET, FILM COATED ORAL DAILY
Qty: 90 TABLET | Refills: 0 | Status: SHIPPED | OUTPATIENT
Start: 2024-04-23

## 2024-04-23 RX ORDER — LISDEXAMFETAMINE DIMESYLATE 50 MG/1
50 CAPSULE ORAL EVERY MORNING
Qty: 30 CAPSULE | Refills: 0 | Status: SHIPPED | OUTPATIENT
Start: 2024-04-23 | End: 2024-04-28

## 2024-04-28 DIAGNOSIS — F98.8 ATTENTION DEFICIT DISORDER (ADD) WITHOUT HYPERACTIVITY: ICD-10-CM

## 2024-04-28 RX ORDER — LISDEXAMFETAMINE DIMESYLATE 50 MG/1
50 CAPSULE ORAL EVERY MORNING
Qty: 30 CAPSULE | Refills: 0 | Status: SHIPPED | OUTPATIENT
Start: 2024-04-28 | End: 2024-05-22

## 2024-04-29 DIAGNOSIS — F98.8 ATTENTION DEFICIT DISORDER (ADD) WITHOUT HYPERACTIVITY: ICD-10-CM

## 2024-05-03 RX ORDER — LISDEXAMFETAMINE DIMESYLATE 50 MG/1
50 CAPSULE ORAL EVERY MORNING
Qty: 30 CAPSULE | Refills: 0 | OUTPATIENT
Start: 2024-05-03

## 2024-05-03 NOTE — TELEPHONE ENCOUNTER
Spoke to pharmacist, script canceled.   Spoke to pt to get clarification, picked up somewhere else

## 2024-05-22 DIAGNOSIS — F98.8 ATTENTION DEFICIT DISORDER (ADD) WITHOUT HYPERACTIVITY: ICD-10-CM

## 2024-05-22 DIAGNOSIS — B00.9 HERPES: ICD-10-CM

## 2024-05-22 RX ORDER — LISDEXAMFETAMINE DIMESYLATE 50 MG/1
50 CAPSULE ORAL EVERY MORNING
Qty: 30 CAPSULE | Refills: 0 | Status: SHIPPED | OUTPATIENT
Start: 2024-05-22

## 2024-05-22 RX ORDER — VALACYCLOVIR HYDROCHLORIDE 1 G/1
1000 TABLET, FILM COATED ORAL DAILY
Qty: 90 TABLET | Refills: 0 | Status: SHIPPED | OUTPATIENT
Start: 2024-05-22

## 2024-05-22 NOTE — TELEPHONE ENCOUNTER
Vyvanse 50 mg  Filled 4-28-24  Qty 30  0 refills  No future appointments.  LOV 1-30-24 SM    Valacyclovir 1 g  Filled 4-23-24  Qty 90  0 refills  No future appointments.  LOV 1-30-24 SM

## 2024-06-25 DIAGNOSIS — B00.9 HERPES: ICD-10-CM

## 2024-06-25 DIAGNOSIS — F98.8 ATTENTION DEFICIT DISORDER (ADD) WITHOUT HYPERACTIVITY: ICD-10-CM

## 2024-06-25 RX ORDER — VALACYCLOVIR HYDROCHLORIDE 1 G/1
1000 TABLET, FILM COATED ORAL DAILY
Qty: 90 TABLET | Refills: 0 | Status: SHIPPED | OUTPATIENT
Start: 2024-06-25

## 2024-06-25 RX ORDER — LISDEXAMFETAMINE DIMESYLATE 50 MG/1
50 CAPSULE ORAL EVERY MORNING
Qty: 30 CAPSULE | Refills: 0 | Status: SHIPPED | OUTPATIENT
Start: 2024-06-25

## 2024-07-25 DIAGNOSIS — F98.8 ATTENTION DEFICIT DISORDER (ADD) WITHOUT HYPERACTIVITY: ICD-10-CM

## 2024-07-25 DIAGNOSIS — E78.2 MIXED HYPERLIPIDEMIA: ICD-10-CM

## 2024-07-25 RX ORDER — ATORVASTATIN CALCIUM 10 MG/1
10 TABLET, FILM COATED ORAL DAILY
Qty: 90 TABLET | Refills: 0 | Status: SHIPPED | OUTPATIENT
Start: 2024-07-25 | End: 2024-08-25

## 2024-07-25 RX ORDER — LISDEXAMFETAMINE DIMESYLATE 50 MG/1
50 CAPSULE ORAL EVERY MORNING
Qty: 30 CAPSULE | Refills: 0 | OUTPATIENT
Start: 2024-07-25

## 2024-07-25 NOTE — TELEPHONE ENCOUNTER
Message sent to pt to schedule appt.     Atorvastatin 10 mg  Filled 4-23-24  Qty 90  0 refills  No future appointments.  LOV 1-30-24 SM    Vyvanse 50 mg  Filled 6-25-24  Qty 30  0 refills  No future appointments.  LOV 1-30-24 SM

## 2024-07-29 ENCOUNTER — OFFICE VISIT (OUTPATIENT)
Dept: INTERNAL MEDICINE CLINIC | Facility: CLINIC | Age: 44
End: 2024-07-29
Payer: COMMERCIAL

## 2024-07-29 VITALS
HEART RATE: 112 BPM | RESPIRATION RATE: 16 BRPM | HEIGHT: 68.11 IN | WEIGHT: 205.38 LBS | OXYGEN SATURATION: 98 % | TEMPERATURE: 98 F | SYSTOLIC BLOOD PRESSURE: 122 MMHG | DIASTOLIC BLOOD PRESSURE: 68 MMHG | BODY MASS INDEX: 31.13 KG/M2

## 2024-07-29 DIAGNOSIS — F98.8 ATTENTION DEFICIT DISORDER (ADD) WITHOUT HYPERACTIVITY: ICD-10-CM

## 2024-07-29 DIAGNOSIS — E78.2 MIXED HYPERLIPIDEMIA: Primary | ICD-10-CM

## 2024-07-29 PROCEDURE — 3008F BODY MASS INDEX DOCD: CPT | Performed by: FAMILY MEDICINE

## 2024-07-29 PROCEDURE — 3078F DIAST BP <80 MM HG: CPT | Performed by: FAMILY MEDICINE

## 2024-07-29 PROCEDURE — 3074F SYST BP LT 130 MM HG: CPT | Performed by: FAMILY MEDICINE

## 2024-07-29 PROCEDURE — 99213 OFFICE O/P EST LOW 20 MIN: CPT | Performed by: FAMILY MEDICINE

## 2024-07-29 RX ORDER — LISDEXAMFETAMINE DIMESYLATE 50 MG/1
50 CAPSULE ORAL EVERY MORNING
Qty: 30 CAPSULE | Refills: 0 | Status: SHIPPED | OUTPATIENT
Start: 2024-07-29

## 2024-07-29 NOTE — PROGRESS NOTES
Johny Estrada is a 43 year old male.     HPI:   Patient presents for recheck of his ADHD.  Patient has been using the stimulant medication, Vyvanse, on a regular basis. Pt was last seen 1/30/24.  Medication changes at that time:  none.  Patient feels the medication has improved focus. Feedback from family, friends, teachers:  .  Pt has been taking medications as instructed, no medication side effects.  Patient's appetite is good. Patient denies headaches, tics or insomnia.  Patient denies any chest pain, palpitations, or dyspnea.  Patient denies any depressive symptoms or anxiety.     Patient also here for recheck on  hyperlipidemia.   Currently asymptomatic, no chest pains, jaw pains, arm pains. No skin lesions.  No SOB on exertion or rest.  Patient denies any myalgias or arthralgias on the medication. Pt has been following a low fat diet and has been exercising 5-7 days a week.  Current lipid treatment: Lipitor      Wt Readings from Last 6 Encounters:   07/29/24 205 lb 6.4 oz (93.2 kg)   01/30/24 213 lb 6.4 oz (96.8 kg)   07/26/23 216 lb (98 kg)   01/09/23 215 lb (97.5 kg)   09/07/22 214 lb (97.1 kg)   03/17/22 215 lb (97.5 kg)     Body mass index is 31.13 kg/m².    Current Outpatient Medications   Medication Sig Dispense Refill    lisdexamfetamine (VYVANSE) 50 MG Oral Cap Take 1 capsule (50 mg total) by mouth every morning. 30 capsule 0    atorvastatin 10 MG Oral Tab Take 1 tablet (10 mg total) by mouth daily. 90 tablet 0    valACYclovir 1 G Oral Tab Take 1 tablet (1,000 mg total) by mouth daily. 90 tablet 0      Past Medical History:    ADHD (attention deficit hyperactivity disorder)         Social History:    Social History     Socioeconomic History    Marital status:    Tobacco Use    Smoking status: Never    Smokeless tobacco: Never   Vaping Use    Vaping status: Never Used   Substance and Sexual Activity    Alcohol use: Not Currently    Drug use: No    Sexual activity: Yes     Partners: Female    Other Topics Concern    Caffeine Concern Yes     Comment: Pre-workout daily.     Exercise Yes     Comment: 6 days/week          REVIEW OF SYSTEMS:   GENERAL HEALTH: feels well otherwise  RESPIRATORY: denies shortness of breath with exertion  CARDIOVASCULAR: denies chest pain, denies palpitations  GI: denies abdominal pain  NEURO: denies headaches  PSYCH: see HPI  EXAM:   /68 (BP Location: Left arm, Patient Position: Sitting, Cuff Size: large)   Pulse 112   Temp 98.4 °F (36.9 °C) (Temporal)   Resp 16   Ht 5' 8.11\" (1.73 m)   Wt 205 lb 6.4 oz (93.2 kg)   SpO2 98%   BMI 31.13 kg/m²   GENERAL: well developed, well nourished,in no apparent distress  HEENT: clear  NECK: supple,no adenopathy,no thyromegaly  LUNGS: clear to auscultation  CARDIO: RRR without murmur  SKIN: no rashes,no suspicious lesions  PSYCH:  Alert, good attention.  Mood, affect, and behavior is normal.    ASSESSMENT AND PLAN:     Encounter Diagnoses   Name Primary?    Attention deficit disorder (ADD) without hyperactivity     Mixed hyperlipidemia Yes       Orders Placed This Encounter   Procedures    Lipid Panel    AST (SGOT) [E]    ALT(SGPT) [E]       Meds & Refills for this Visit:  Requested Prescriptions     Signed Prescriptions Disp Refills    lisdexamfetamine (VYVANSE) 50 MG Oral Cap 30 capsule 0     Sig: Take 1 capsule (50 mg total) by mouth every morning.       1. Attention deficit disorder (ADD) without hyperactivity  - stable, continue current medication  - lisdexamfetamine (VYVANSE) 50 MG Oral Cap; Take 1 capsule (50 mg total) by mouth every morning.  Dispense: 30 capsule; Refill: 0    2. Mixed hyperlipidemia  Pt to continue their medication, increase exercise,  choose better fats,  increase fiber and avoid processed foods.    - Lipid Panel; Future  - AST (SGOT) [E]; Future  - ALT(SGPT) [E]; Future    The patient indicates understanding of these issues and agrees to the plan.  The patient is asked to return in 6 months. Px due  Jan 2025.

## 2024-08-25 DIAGNOSIS — F98.8 ATTENTION DEFICIT DISORDER (ADD) WITHOUT HYPERACTIVITY: ICD-10-CM

## 2024-08-25 DIAGNOSIS — B00.9 HERPES: ICD-10-CM

## 2024-08-25 DIAGNOSIS — E78.2 MIXED HYPERLIPIDEMIA: ICD-10-CM

## 2024-08-26 RX ORDER — LISDEXAMFETAMINE DIMESYLATE 50 MG/1
50 CAPSULE ORAL EVERY MORNING
Qty: 30 CAPSULE | Refills: 0 | Status: SHIPPED | OUTPATIENT
Start: 2024-08-26

## 2024-08-26 RX ORDER — VALACYCLOVIR HYDROCHLORIDE 1 G/1
1000 TABLET, FILM COATED ORAL DAILY
Qty: 90 TABLET | Refills: 0 | Status: SHIPPED | OUTPATIENT
Start: 2024-08-26

## 2024-08-26 RX ORDER — ATORVASTATIN CALCIUM 10 MG/1
10 TABLET, FILM COATED ORAL DAILY
Qty: 90 TABLET | Refills: 0 | Status: SHIPPED | OUTPATIENT
Start: 2024-08-26

## 2024-08-26 NOTE — TELEPHONE ENCOUNTER
Atorvastatin 10 mg  Filled 7-25-24  Qty 90  0 refills  No future appointments.  LOV 07-29-24   Labs 12-13-23 Lipid    Vyvanse 50 mg  Filled 7-29-24  Qty 30  0 refills  No future appointments.  LOV 07-29-24     Valacyclovir 1 g  Filled 6-25-24  Qty 90  0 refills  No future appointments.  LOV 07-29-24 SM

## 2024-09-24 DIAGNOSIS — F98.8 ATTENTION DEFICIT DISORDER (ADD) WITHOUT HYPERACTIVITY: ICD-10-CM

## 2024-09-24 DIAGNOSIS — B00.9 HERPES: ICD-10-CM

## 2024-09-24 RX ORDER — VALACYCLOVIR HYDROCHLORIDE 1 G/1
1000 TABLET, FILM COATED ORAL DAILY
Qty: 90 TABLET | Refills: 0 | Status: SHIPPED | OUTPATIENT
Start: 2024-09-24

## 2024-09-24 RX ORDER — LISDEXAMFETAMINE DIMESYLATE 50 MG/1
50 CAPSULE ORAL EVERY MORNING
Qty: 30 CAPSULE | Refills: 0 | Status: SHIPPED | OUTPATIENT
Start: 2024-09-24

## 2024-09-24 NOTE — TELEPHONE ENCOUNTER
Vyvanse 50 mg  Filled 8-26-24  Qty 30  0 refills  No future appointments.  LOV 7-29-24 SM    Valacyclovir 1 g  Filled 8-26-24  Qty 90  0 refills  No future appointments.  LOV 7-29-24 SM

## 2024-10-14 ENCOUNTER — LAB ENCOUNTER (OUTPATIENT)
Dept: LAB | Age: 44
End: 2024-10-14
Attending: FAMILY MEDICINE
Payer: COMMERCIAL

## 2024-10-14 DIAGNOSIS — R73.01 IMPAIRED FASTING GLUCOSE: ICD-10-CM

## 2024-10-14 DIAGNOSIS — E78.2 MIXED HYPERLIPIDEMIA: ICD-10-CM

## 2024-10-14 DIAGNOSIS — R73.09 ELEVATED HEMOGLOBIN A1C: ICD-10-CM

## 2024-10-14 LAB
ALT SERPL-CCNC: 35 U/L
AST SERPL-CCNC: 33 U/L (ref ?–34)
CHOLEST SERPL-MCNC: 207 MG/DL (ref ?–200)
EST. AVERAGE GLUCOSE BLD GHB EST-MCNC: 114 MG/DL (ref 68–126)
FASTING PATIENT LIPID ANSWER: YES
HBA1C MFR BLD: 5.6 % (ref ?–5.7)
HDLC SERPL-MCNC: 61 MG/DL (ref 40–59)
LDLC SERPL CALC-MCNC: 139 MG/DL (ref ?–100)
NONHDLC SERPL-MCNC: 146 MG/DL (ref ?–130)
TRIGL SERPL-MCNC: 41 MG/DL (ref 30–149)
VLDLC SERPL CALC-MCNC: 7 MG/DL (ref 0–30)

## 2024-10-14 PROCEDURE — 84460 ALANINE AMINO (ALT) (SGPT): CPT | Performed by: FAMILY MEDICINE

## 2024-10-14 PROCEDURE — 83036 HEMOGLOBIN GLYCOSYLATED A1C: CPT | Performed by: FAMILY MEDICINE

## 2024-10-14 PROCEDURE — 80061 LIPID PANEL: CPT | Performed by: FAMILY MEDICINE

## 2024-10-14 PROCEDURE — 84450 TRANSFERASE (AST) (SGOT): CPT | Performed by: FAMILY MEDICINE

## 2024-10-28 DIAGNOSIS — F98.8 ATTENTION DEFICIT DISORDER (ADD) WITHOUT HYPERACTIVITY: ICD-10-CM

## 2024-10-28 DIAGNOSIS — B00.9 HERPES: ICD-10-CM

## 2024-10-28 RX ORDER — VALACYCLOVIR HYDROCHLORIDE 1 G/1
1000 TABLET, FILM COATED ORAL DAILY
Qty: 90 TABLET | Refills: 0 | Status: SHIPPED | OUTPATIENT
Start: 2024-10-28

## 2024-10-28 RX ORDER — LISDEXAMFETAMINE DIMESYLATE 50 MG/1
50 CAPSULE ORAL EVERY MORNING
Qty: 30 CAPSULE | Refills: 0 | Status: SHIPPED | OUTPATIENT
Start: 2024-10-28

## 2024-10-28 NOTE — TELEPHONE ENCOUNTER
Vyvanse 50 mg  Filled 9-24-24  Qty 30  0 refills  No future appointments.  LOV 7-29-24 SM    Valcyclovir 1 g  Filled 9-24-24  Qty 90  0 refills  No future appointments.  LOV 7-29-24 SM

## 2024-11-25 DIAGNOSIS — F98.8 ATTENTION DEFICIT DISORDER (ADD) WITHOUT HYPERACTIVITY: ICD-10-CM

## 2024-11-25 DIAGNOSIS — E78.2 MIXED HYPERLIPIDEMIA: ICD-10-CM

## 2024-11-25 DIAGNOSIS — B00.9 HERPES: ICD-10-CM

## 2024-11-25 RX ORDER — ATORVASTATIN CALCIUM 10 MG/1
10 TABLET, FILM COATED ORAL DAILY
Qty: 90 TABLET | Refills: 0 | Status: SHIPPED | OUTPATIENT
Start: 2024-11-25

## 2024-11-25 RX ORDER — LISDEXAMFETAMINE DIMESYLATE 50 MG/1
50 CAPSULE ORAL EVERY MORNING
Qty: 30 CAPSULE | Refills: 0 | Status: SHIPPED | OUTPATIENT
Start: 2024-11-25

## 2024-11-25 RX ORDER — VALACYCLOVIR HYDROCHLORIDE 1 G/1
1000 TABLET, FILM COATED ORAL DAILY
Qty: 90 TABLET | Refills: 0 | Status: SHIPPED | OUTPATIENT
Start: 2024-11-25

## 2024-11-25 NOTE — TELEPHONE ENCOUNTER
Atorvastatin 10 mg  Filled 8-26-24  Qty 90  0 refills  No future appointments.  LOV 7-29-24 SM  Labs 10-14-24 Lipid    Vyvanse 50 mg  Filled 10-28-24  Qty 30  0 refills  No future appointments.  LOV 7-29-24 SM    Valacyclovir 1 g  Filled 10-28-24  Qty 90  0 refills  No future appointments.  LOV 7-29-24 SM

## 2024-12-26 DIAGNOSIS — F98.8 ATTENTION DEFICIT DISORDER (ADD) WITHOUT HYPERACTIVITY: ICD-10-CM

## 2024-12-26 DIAGNOSIS — B00.9 HERPES: ICD-10-CM

## 2024-12-26 RX ORDER — LISDEXAMFETAMINE DIMESYLATE 50 MG/1
50 CAPSULE ORAL EVERY MORNING
Qty: 30 CAPSULE | Refills: 0 | Status: SHIPPED | OUTPATIENT
Start: 2024-12-26

## 2024-12-26 RX ORDER — VALACYCLOVIR HYDROCHLORIDE 1 G/1
1000 TABLET, FILM COATED ORAL DAILY
Qty: 90 TABLET | Refills: 0 | OUTPATIENT
Start: 2024-12-26

## 2024-12-26 NOTE — TELEPHONE ENCOUNTER
Vyvanse 50 mg  Filled 11-25-24  Qty 30  0 refills  No future appointments.  LOV 7-29-24 SM    Valacyclovir 1 g- Too early for refill  Filled 11-25-24  Qty 90

## 2025-01-21 ENCOUNTER — OFFICE VISIT (OUTPATIENT)
Dept: INTERNAL MEDICINE CLINIC | Facility: CLINIC | Age: 45
End: 2025-01-21
Payer: COMMERCIAL

## 2025-01-21 VITALS
TEMPERATURE: 98 F | BODY MASS INDEX: 32.89 KG/M2 | OXYGEN SATURATION: 97 % | RESPIRATION RATE: 16 BRPM | HEART RATE: 76 BPM | WEIGHT: 217 LBS | SYSTOLIC BLOOD PRESSURE: 122 MMHG | HEIGHT: 68.11 IN | DIASTOLIC BLOOD PRESSURE: 78 MMHG

## 2025-01-21 DIAGNOSIS — E78.2 MIXED HYPERLIPIDEMIA: ICD-10-CM

## 2025-01-21 DIAGNOSIS — F98.8 ATTENTION DEFICIT DISORDER (ADD) WITHOUT HYPERACTIVITY: ICD-10-CM

## 2025-01-21 DIAGNOSIS — B00.9 HERPES: ICD-10-CM

## 2025-01-21 DIAGNOSIS — Z00.00 ROUTINE GENERAL MEDICAL EXAMINATION AT A HEALTH CARE FACILITY: Primary | ICD-10-CM

## 2025-01-21 RX ORDER — VALACYCLOVIR HYDROCHLORIDE 1 G/1
1000 TABLET, FILM COATED ORAL DAILY
Qty: 90 TABLET | Refills: 0 | Status: SHIPPED | OUTPATIENT
Start: 2025-01-21

## 2025-01-21 RX ORDER — LISDEXAMFETAMINE DIMESYLATE 50 MG/1
50 CAPSULE ORAL EVERY MORNING
Qty: 30 CAPSULE | Refills: 0 | Status: SHIPPED | OUTPATIENT
Start: 2025-01-21

## 2025-01-21 RX ORDER — ATORVASTATIN CALCIUM 10 MG/1
10 TABLET, FILM COATED ORAL DAILY
Qty: 90 TABLET | Refills: 0 | Status: SHIPPED | OUTPATIENT
Start: 2025-01-21

## 2025-01-21 NOTE — PROGRESS NOTES
Johny Estrada is a 44 year old male who presents for a complete physical exam.   HPI:   Pt complains of none.  Screening:  Tdap - 1/4/20  Labs- partial 10/14/24  PSA- n/a  Colon- n/a   Dexa Scan over age 70 yr: n/a  Family Hx: Prostate cancer none, Colon cancer none  Flu shot- Had at his job in the fall  Glasses/contacts- yes,last exam- 2024  Dental visits - 2024    Patient presents for recheck of his ADHD.  Patient has been using the stimulant medication, Vyvanse, on a regular basis. Patient was last seen 6 months ago.  Medication changes at that time:  none.  Patient feels the medication continues to improved his focus.  Pt has been taking medications as instructed, no medication side effects.  Patient's appetite is good. Patient denies headaches, tics or insomnia.  Patient denies any chest pain, palpitations, or dyspnea.  Patient denies any depressive symptoms or anxiety.     Pt also here for a recheck on  hyperlipidemia.   Currently asymptomatic, no chest pains, jaw pains, arm pains. No skin lesions.  No SOB on exertion or rest.  Patient denies any myalgias or arthralgias on the medication. Pt has been following a low fat diet and has been exercising 5-7 days a week.  Current lipid treatment: lipitor    Pt also needs a refill on his Valtrex for his herpes. Stable, no issues with the medication.    Immunization History   Administered Date(s) Administered    Covid-19 Vaccine Pfizer 30 mcg/0.3 ml 12/17/2020, 01/07/2021, 11/10/2021    FLULAVAL 6 months & older 0.5 ml Prefilled syringe (21088) 01/04/2020, 10/27/2020    Influenza 10/13/2018, 10/16/2022, 11/15/2023    TDAP 05/24/2018, 01/04/2020    Tb Intradermal Test 11/15/2023     Wt Readings from Last 6 Encounters:   01/21/25 217 lb (98.4 kg)   07/29/24 205 lb 6.4 oz (93.2 kg)   01/30/24 213 lb 6.4 oz (96.8 kg)   07/26/23 216 lb (98 kg)   01/09/23 215 lb (97.5 kg)   09/07/22 214 lb (97.1 kg)     Body mass index is 32.89 kg/m².     Lab Results   Component Value  Date    GLU 90 02/23/2024    GLU 98 01/09/2023    GLU 97 05/28/2021     Lab Results   Component Value Date    CHOLEST 207 (H) 10/14/2024    CHOLEST 200 (H) 12/13/2023    CHOLEST 229 (H) 01/09/2023     Lab Results   Component Value Date    HDL 61 (H) 10/14/2024    HDL 63 (H) 12/13/2023    HDL 66 (H) 01/09/2023     Lab Results   Component Value Date     (H) 10/14/2024     (H) 12/13/2023     (H) 01/09/2023     Lab Results   Component Value Date    AST 33 10/14/2024    AST 30 02/23/2024    AST 25 01/09/2023     Lab Results   Component Value Date    ALT 35 10/14/2024    ALT 44 02/23/2024    ALT 32 01/09/2023     No results found for: \"PSA\"     Current Outpatient Medications   Medication Sig Dispense Refill    lisdexamfetamine (VYVANSE) 50 MG Oral Cap Take 1 capsule (50 mg total) by mouth every morning. PHYSICAL DUE JANUARY 30 capsule 0    atorvastatin 10 MG Oral Tab Take 1 tablet (10 mg total) by mouth daily. 90 tablet 0    valACYclovir 1 G Oral Tab Take 1 tablet (1,000 mg total) by mouth daily. 90 tablet 0      Past Medical History:    ADHD (attention deficit hyperactivity disorder)      Past Surgical History:   Procedure Laterality Date    Appendectomy      Breast surgery procedure unlisted      B/L breast reduction procedure      Family History   Problem Relation Age of Onset    Diabetes Other       Social History:  Social History     Socioeconomic History    Marital status:    Tobacco Use    Smoking status: Never    Smokeless tobacco: Never   Vaping Use    Vaping status: Never Used   Substance and Sexual Activity    Alcohol use: Not Currently    Drug use: No    Sexual activity: Yes     Partners: Female   Other Topics Concern    Caffeine Concern Yes     Comment: Pre-workout daily.     Exercise Yes     Comment: 6 days/week      Social Drivers of Health     Food Insecurity: No Food Insecurity (1/21/2025)    NCSS - Food Insecurity     Worried About Running Out of Food in the Last Year: No      Ran Out of Food in the Last Year: No   Transportation Needs: No Transportation Needs (1/21/2025)    NCSS - Transportation     Lack of Transportation: No   Housing Stability: Not At Risk (1/21/2025)    NCSS - Housing/Utilities     Has Housing: Yes     Worried About Losing Housing: No     Unable to Get Utilities: No      Occ: . :. Children: 2.   Exercise:  6 times per week.  Diet: watches sugar closely, watches calories closely, and low carb diet     REVIEW OF SYSTEMS:   GENERAL: feels well otherwise  SKIN: denies any unusual skin lesions,+herpes  EYES:denies blurred vision or double vision  HEENT: denies nasal congestion, sinus pain or ST  LUNGS: denies shortness of breath with exertion  CARDIOVASCULAR: denies chest pain on exertion  GI: denies abdominal pain,denies heartburn  : denies nocturia or changes in stream  MUSCULOSKELETAL: hx of back pain  NEURO: denies headaches  PSYCHE: denies depression or anxiety,+ADD  HEMATOLOGIC: denies hx of anemia  ENDOCRINE: denies thyroid history  ALL/ASTHMA: denies hx of allergy or asthma    EXAM:   /78 (BP Location: Left arm, Patient Position: Sitting, Cuff Size: large)   Pulse 76   Temp 97.7 °F (36.5 °C) (Temporal)   Resp 16   Ht 5' 8.11\" (1.73 m)   Wt 217 lb (98.4 kg)   SpO2 97%   BMI 32.89 kg/m²   Body mass index is 32.89 kg/m².   GENERAL: well developed, well nourished,in no apparent distress  SKIN: no rashes,no suspicious lesions  HEENT: atraumatic, normocephalic,ears and throat are clear  EYES:PERRLA, EOMI, normal optic disk,conjunctiva are clear  NECK: supple,no adenopathy,no bruits  CHEST: no chest tenderness  BREAST: no dominant or suspicious mass  LUNGS: clear to auscultation  CARDIO: RRR without murmur  GI: good BS's,no masses, HSM or tenderness  : deferred  RECTAL: deferred  MUSCULOSKELETAL: back is not tender,FROM of the back  EXTREMITIES: no cyanosis, clubbing or edema  NEURO: Oriented times three,cranial nerves are  intact,motor and sensory are grossly intact    ASSESSMENT AND PLAN:   Johny Estrada is a 44 year old male who presents for a complete physical exam. Pt's weight is Body mass index is 32.89 kg/m²., recommended low fat diet and aerobic exercise 30 minutes three times weekly. Health maintenance, will check fasting Labs.. The patient indicates understanding of these issues and agrees to the plan.  The patient is asked to return for CPX in 1 year, 6 month med f/u.  1. Mixed hyperlipidemia  Pt to continue their medication, increase exercise,  choose better fats,  increase fiber and avoid processed foods.  - atorvastatin 10 MG Oral Tab; Take 1 tablet (10 mg total) by mouth daily.  Dispense: 90 tablet; Refill: 0    2. Attention deficit disorder (ADD) without hyperactivity  -stable, continue medications  - lisdexamfetamine (VYVANSE) 50 MG Oral Cap; Take 1 capsule (50 mg total) by mouth every morning.  Dispense: 30 capsule; Refill: 0    3. Herpes  Stable, continue medications  - valACYclovir 1 G Oral Tab; Take 1 tablet (1,000 mg total) by mouth daily.  Dispense: 90 tablet; Refill: 0    4. Routine general medical examination at a health care facility    - CBC W Differential W Platelet [E]; Future  - Comp Metabolic Panel (14) [E]; Future  - Lipid Panel [E]; Future  - TSH W Reflex To Free T4 [E]; Future  - Vitamin D [E]; Future    Encounter Diagnoses   Name Primary?    Mixed hyperlipidemia     Attention deficit disorder (ADD) without hyperactivity     Herpes     Routine general medical examination at a health care facility Yes       Orders Placed This Encounter   Procedures    CBC W Differential W Platelet [E]    Comp Metabolic Panel (14) [E]    Lipid Panel [E]    TSH W Reflex To Free T4 [E]    Vitamin D [E]       Meds & Refills for this Visit:  Requested Prescriptions     Signed Prescriptions Disp Refills    atorvastatin 10 MG Oral Tab 90 tablet 0     Sig: Take 1 tablet (10 mg total) by mouth daily.    lisdexamfetamine  (VYVANSE) 50 MG Oral Cap 30 capsule 0     Sig: Take 1 capsule (50 mg total) by mouth every morning.    valACYclovir 1 G Oral Tab 90 tablet 0     Sig: Take 1 tablet (1,000 mg total) by mouth daily.       Imaging & Consults:  None

## 2025-01-26 DIAGNOSIS — B00.9 HERPES: ICD-10-CM

## 2025-01-26 DIAGNOSIS — E78.2 MIXED HYPERLIPIDEMIA: ICD-10-CM

## 2025-01-26 DIAGNOSIS — F98.8 ATTENTION DEFICIT DISORDER (ADD) WITHOUT HYPERACTIVITY: ICD-10-CM

## 2025-01-27 RX ORDER — VALACYCLOVIR HYDROCHLORIDE 1 G/1
1000 TABLET, FILM COATED ORAL DAILY
Qty: 90 TABLET | Refills: 0 | OUTPATIENT
Start: 2025-01-27

## 2025-01-27 RX ORDER — ATORVASTATIN CALCIUM 10 MG/1
10 TABLET, FILM COATED ORAL DAILY
Qty: 90 TABLET | Refills: 0 | OUTPATIENT
Start: 2025-01-27

## 2025-01-27 RX ORDER — LISDEXAMFETAMINE DIMESYLATE 50 MG/1
50 CAPSULE ORAL EVERY MORNING
Qty: 30 CAPSULE | Refills: 0 | OUTPATIENT
Start: 2025-01-27

## 2025-01-29 DIAGNOSIS — B00.9 HERPES: ICD-10-CM

## 2025-01-29 DIAGNOSIS — F98.8 ATTENTION DEFICIT DISORDER (ADD) WITHOUT HYPERACTIVITY: ICD-10-CM

## 2025-01-29 RX ORDER — LISDEXAMFETAMINE DIMESYLATE 50 MG/1
50 CAPSULE ORAL EVERY MORNING
Qty: 30 CAPSULE | Refills: 0 | Status: SHIPPED | OUTPATIENT
Start: 2025-01-29

## 2025-01-29 RX ORDER — VALACYCLOVIR HYDROCHLORIDE 1 G/1
1000 TABLET, FILM COATED ORAL DAILY
Qty: 90 TABLET | Refills: 0 | Status: SHIPPED | OUTPATIENT
Start: 2025-01-29

## 2025-01-29 NOTE — TELEPHONE ENCOUNTER
Pt is requesting a refill for 'vyvanse' and 'valacyclovir' since he is out of them. Pt would like this sent asap to the pharm.    lisdexamfetamine (VYVANSE) 50 MG Oral Cap 30 capsule     valACYclovir 1 G Oral Tab 90 tablet     [id number] - [place] [address] AT [facility] [address], [phone], [phone] [[id number]]

## 2025-01-30 ENCOUNTER — LAB ENCOUNTER (OUTPATIENT)
Dept: LAB | Age: 45
End: 2025-01-30
Attending: FAMILY MEDICINE
Payer: COMMERCIAL

## 2025-01-30 DIAGNOSIS — E78.2 MIXED HYPERLIPIDEMIA: ICD-10-CM

## 2025-01-30 DIAGNOSIS — E55.9 VITAMIN D DEFICIENCY: Primary | ICD-10-CM

## 2025-01-30 DIAGNOSIS — Z00.00 ROUTINE GENERAL MEDICAL EXAMINATION AT A HEALTH CARE FACILITY: ICD-10-CM

## 2025-01-30 LAB
ALBUMIN SERPL-MCNC: 4.5 G/DL (ref 3.2–4.8)
ALBUMIN/GLOB SERPL: 1.6 {RATIO} (ref 1–2)
ALP LIVER SERPL-CCNC: 61 U/L
ALT SERPL-CCNC: 36 U/L
ANION GAP SERPL CALC-SCNC: 10 MMOL/L (ref 0–18)
AST SERPL-CCNC: 33 U/L (ref ?–34)
BASOPHILS # BLD AUTO: 0.05 X10(3) UL (ref 0–0.2)
BASOPHILS NFR BLD AUTO: 0.6 %
BILIRUB SERPL-MCNC: 0.7 MG/DL (ref 0.3–1.2)
BUN BLD-MCNC: 19 MG/DL (ref 9–23)
BUN/CREAT SERPL: 20.9 (ref 10–20)
CALCIUM BLD-MCNC: 9.7 MG/DL (ref 8.7–10.4)
CHLORIDE SERPL-SCNC: 106 MMOL/L (ref 98–112)
CHOLEST SERPL-MCNC: 203 MG/DL (ref ?–200)
CO2 SERPL-SCNC: 27 MMOL/L (ref 21–32)
CREAT BLD-MCNC: 0.91 MG/DL
DEPRECATED RDW RBC AUTO: 42.7 FL (ref 35.1–46.3)
EGFRCR SERPLBLD CKD-EPI 2021: 107 ML/MIN/1.73M2 (ref 60–?)
EOSINOPHIL # BLD AUTO: 0.08 X10(3) UL (ref 0–0.7)
EOSINOPHIL NFR BLD AUTO: 0.9 %
ERYTHROCYTE [DISTWIDTH] IN BLOOD BY AUTOMATED COUNT: 12.4 % (ref 11–15)
FASTING PATIENT LIPID ANSWER: YES
FASTING STATUS PATIENT QL REPORTED: YES
GLOBULIN PLAS-MCNC: 2.9 G/DL (ref 2–3.5)
GLUCOSE BLD-MCNC: 87 MG/DL (ref 70–99)
HCT VFR BLD AUTO: 41.1 %
HDLC SERPL-MCNC: 56 MG/DL (ref 40–59)
HGB BLD-MCNC: 13.6 G/DL
IMM GRANULOCYTES # BLD AUTO: 0.02 X10(3) UL (ref 0–1)
IMM GRANULOCYTES NFR BLD: 0.2 %
LDLC SERPL CALC-MCNC: 140 MG/DL (ref ?–100)
LYMPHOCYTES # BLD AUTO: 2.04 X10(3) UL (ref 1–4)
LYMPHOCYTES NFR BLD AUTO: 24.1 %
MCH RBC QN AUTO: 30.7 PG (ref 26–34)
MCHC RBC AUTO-ENTMCNC: 33.1 G/DL (ref 31–37)
MCV RBC AUTO: 92.8 FL
MONOCYTES # BLD AUTO: 0.47 X10(3) UL (ref 0.1–1)
MONOCYTES NFR BLD AUTO: 5.6 %
NEUTROPHILS # BLD AUTO: 5.79 X10 (3) UL (ref 1.5–7.7)
NEUTROPHILS # BLD AUTO: 5.79 X10(3) UL (ref 1.5–7.7)
NEUTROPHILS NFR BLD AUTO: 68.6 %
NONHDLC SERPL-MCNC: 147 MG/DL (ref ?–130)
OSMOLALITY SERPL CALC.SUM OF ELEC: 298 MOSM/KG (ref 275–295)
PLATELET # BLD AUTO: 383 10(3)UL (ref 150–450)
POTASSIUM SERPL-SCNC: 4 MMOL/L (ref 3.5–5.1)
PROT SERPL-MCNC: 7.4 G/DL (ref 5.7–8.2)
RBC # BLD AUTO: 4.43 X10(6)UL
SODIUM SERPL-SCNC: 143 MMOL/L (ref 136–145)
TRIGL SERPL-MCNC: 37 MG/DL (ref 30–149)
TSI SER-ACNC: 1.29 UIU/ML (ref 0.55–4.78)
VIT D+METAB SERPL-MCNC: 14.1 NG/ML (ref 30–100)
VLDLC SERPL CALC-MCNC: 7 MG/DL (ref 0–30)
WBC # BLD AUTO: 8.5 X10(3) UL (ref 4–11)

## 2025-01-30 PROCEDURE — 80050 GENERAL HEALTH PANEL: CPT | Performed by: FAMILY MEDICINE

## 2025-01-30 PROCEDURE — 82306 VITAMIN D 25 HYDROXY: CPT | Performed by: FAMILY MEDICINE

## 2025-01-30 PROCEDURE — 80061 LIPID PANEL: CPT | Performed by: FAMILY MEDICINE

## 2025-02-25 DIAGNOSIS — F98.8 ATTENTION DEFICIT DISORDER (ADD) WITHOUT HYPERACTIVITY: ICD-10-CM

## 2025-02-25 DIAGNOSIS — B00.9 HERPES: ICD-10-CM

## 2025-02-25 RX ORDER — LISDEXAMFETAMINE DIMESYLATE 50 MG/1
50 CAPSULE ORAL EVERY MORNING
Qty: 30 CAPSULE | Refills: 0 | Status: SHIPPED | OUTPATIENT
Start: 2025-02-25

## 2025-02-25 RX ORDER — VALACYCLOVIR HYDROCHLORIDE 1 G/1
1000 TABLET, FILM COATED ORAL DAILY
Qty: 90 TABLET | Refills: 0 | Status: SHIPPED | OUTPATIENT
Start: 2025-02-25

## 2025-02-25 NOTE — TELEPHONE ENCOUNTER
Vyvanse 50 mg  Filled 1-29-25  Qty 30  0 refills  No future appointments.  LOV 1-21-25 SM    Valacyclovir 1 g  Filled 1-29-25  Qty 90  0 refills  No future appointments.  LOV 1-21-25 SM

## 2025-03-25 DIAGNOSIS — B00.9 HERPES: ICD-10-CM

## 2025-03-25 DIAGNOSIS — E78.2 MIXED HYPERLIPIDEMIA: ICD-10-CM

## 2025-03-25 DIAGNOSIS — F98.8 ATTENTION DEFICIT DISORDER (ADD) WITHOUT HYPERACTIVITY: ICD-10-CM

## 2025-03-25 RX ORDER — VALACYCLOVIR HYDROCHLORIDE 1 G/1
1000 TABLET, FILM COATED ORAL DAILY
Qty: 90 TABLET | Refills: 0 | Status: SHIPPED | OUTPATIENT
Start: 2025-03-25

## 2025-03-25 RX ORDER — ATORVASTATIN CALCIUM 10 MG/1
10 TABLET, FILM COATED ORAL DAILY
Qty: 90 TABLET | Refills: 1 | Status: SHIPPED | OUTPATIENT
Start: 2025-03-25

## 2025-03-25 RX ORDER — LISDEXAMFETAMINE DIMESYLATE 50 MG/1
50 CAPSULE ORAL EVERY MORNING
Qty: 30 CAPSULE | Refills: 0 | Status: SHIPPED | OUTPATIENT
Start: 2025-03-25

## 2025-03-25 NOTE — TELEPHONE ENCOUNTER
Atorvastatin 10 mg  Filled 1-21-25  Qty 90  0 refills  No future appointments.  LOV 1-21-25 SM  Labs 1-30-25 Lipid    Vyvanse 50 mg  Filled 2-25-25  Qty 30  0 refills  No future appointments.  LOV 1-21-25 SM    Valcyclovir 1 g  Filled 2-25-25  Qty 90  0 refills  No future appointments.  LOV 1-21-25 SM

## 2025-04-25 DIAGNOSIS — B00.9 HERPES: ICD-10-CM

## 2025-04-25 DIAGNOSIS — E78.2 MIXED HYPERLIPIDEMIA: ICD-10-CM

## 2025-04-25 DIAGNOSIS — F98.8 ATTENTION DEFICIT DISORDER (ADD) WITHOUT HYPERACTIVITY: ICD-10-CM

## 2025-04-25 RX ORDER — VALACYCLOVIR HYDROCHLORIDE 1 G/1
1000 TABLET, FILM COATED ORAL DAILY
Qty: 90 TABLET | Refills: 0 | Status: CANCELLED | OUTPATIENT
Start: 2025-04-25

## 2025-04-25 RX ORDER — ATORVASTATIN CALCIUM 10 MG/1
10 TABLET, FILM COATED ORAL DAILY
Qty: 90 TABLET | Refills: 1 | Status: CANCELLED | OUTPATIENT
Start: 2025-04-25

## 2025-04-25 NOTE — TELEPHONE ENCOUNTER
Requesting    atorvastatin 10 MG Oral Tab         Sig: Take 1 tablet (10 mg total) by mouth daily.    Disp: 90 tablet    Refills: 1    Start: 4/25/2025    Class: Normal    Non-formulary For: Mixed hyperlipidemia    To pharmacy: **Patient requests 90 days supply**    Last ordered: 1 month ago (3/25/2025) by TEAGAN Cho    Cholesterol Medication Protocol [id number]/25/2025 09:40 AM   Protocol Details ALT < 80    ALT resulted within past year    Lipid panel within past 12 months    In person appointment or virtual visit in the past 12 mos or appointment in next 3 mos    Medication is active on med list       lisdexamfetamine (VYVANSE) 50 MG Oral Cap         Sig: Take 1 capsule (50 mg total) by mouth every morning.    Disp: 30 capsule    Refills: 0    Start: 4/25/2025    Earliest Fill Date: 4/25/2025    Class: Normal    Non-formulary For: Attention deficit disorder (ADD) without hyperactivity    Last ordered: 1 month ago (3/25/2025) by TEAGAN Cho    Controlled Substance Medication [id number]/25/2025 09:40 AM    This medication is a controlled substance - forward to provider to refill    Medication is active on med list       valACYclovir 1 G Oral Tab         Sig: Take 1 tablet (1,000 mg total) by mouth daily.    Disp: 90 tablet    Refills: 0    Start: 4/25/2025    Class: Normal    Non-formulary For: Herpes    Last ordered: 1 month ago (3/25/2025) by TEAGAN Cho    Herpes Agent Protocol [id number]/25/2025 09:40 AM   Protocol Details In person appointment or virtual visit in the past 12 mos or appointment in next 3 mos    Medication is active on med lis           LOV: 1/21/2025  RTC: 6 months   Last Relevant Labs: 1/30/2025      No future appointments.

## 2025-04-26 RX ORDER — LISDEXAMFETAMINE DIMESYLATE 50 MG/1
50 CAPSULE ORAL EVERY MORNING
Qty: 30 CAPSULE | Refills: 0 | Status: SHIPPED | OUTPATIENT
Start: 2025-04-26

## 2025-05-23 DIAGNOSIS — F98.8 ATTENTION DEFICIT DISORDER (ADD) WITHOUT HYPERACTIVITY: ICD-10-CM

## 2025-05-23 DIAGNOSIS — B00.9 HERPES: ICD-10-CM

## 2025-05-23 NOTE — TELEPHONE ENCOUNTER
Requesting    valACYclovir 1 G Oral Tab         Sig: Take 1 tablet (1,000 mg total) by mouth daily.    Disp: 90 tablet    Refills: 0    Start: 5/23/2025    Class: Normal    Non-formulary For: Herpes    Last ordered: 1 month ago (3/25/2025) by TEAGAN Cho    Herpes Agent Protocol [id number]/23/2025 06:21 AM   Protocol Details In person appointment or virtual visit in the past 12 mos or appointment in next 3 mos    Medication is active on med list       lisdexamfetamine (VYVANSE) 50 MG Oral Cap         Sig: Take 1 capsule (50 mg total) by mouth every morning.    Disp: 30 capsule    Refills: 0    Start: 5/23/2025    Earliest Fill Date: 5/23/2025    Class: Normal    Non-formulary For: Attention deficit disorder (ADD) without hyperactivity    Last ordered: 3 weeks ago (4/26/2025) by TEAGAN Cho    Controlled Substance Medication [id number]/23/2025 06:21 AM    This medication is a controlled substance - forward to provider to refill    Medication is active on med list        LOV: 1/21/2025  RTC: 6 months   Last Relevant Labs: n/a   Filled:  Valacyclovir- 3/25/2025 #30 with 0 refills  Vyvanse- 4/27/2025 #30 with 0 refills  No future appointments.

## 2025-05-24 RX ORDER — VALACYCLOVIR HYDROCHLORIDE 1 G/1
1000 TABLET, FILM COATED ORAL DAILY
Qty: 90 TABLET | Refills: 0 | Status: SHIPPED | OUTPATIENT
Start: 2025-05-24

## 2025-05-24 RX ORDER — LISDEXAMFETAMINE DIMESYLATE 50 MG/1
50 CAPSULE ORAL EVERY MORNING
Qty: 30 CAPSULE | Refills: 0 | Status: SHIPPED | OUTPATIENT
Start: 2025-05-24

## 2025-06-26 DIAGNOSIS — B00.9 HERPES: ICD-10-CM

## 2025-06-26 DIAGNOSIS — F98.8 ATTENTION DEFICIT DISORDER (ADD) WITHOUT HYPERACTIVITY: ICD-10-CM

## 2025-06-26 RX ORDER — LISDEXAMFETAMINE DIMESYLATE 50 MG/1
50 CAPSULE ORAL EVERY MORNING
Qty: 30 CAPSULE | Refills: 0 | Status: SHIPPED | OUTPATIENT
Start: 2025-06-26

## 2025-06-26 RX ORDER — VALACYCLOVIR HYDROCHLORIDE 1 G/1
1000 TABLET, FILM COATED ORAL DAILY
Qty: 90 TABLET | Refills: 0 | Status: SHIPPED | OUTPATIENT
Start: 2025-06-26

## 2025-06-26 NOTE — TELEPHONE ENCOUNTER
Vyvanse 50 mg  Filled 5-24-25  Qty 30  0 refills  No future appointments.  LOV 1-21-25 SM    Valavyclovir 1 g  Filled 5-24-25  Qty 90  0 refills  No future appointments.  LOV 1-21-25 SM

## 2025-07-15 ENCOUNTER — HOSPITAL ENCOUNTER (OUTPATIENT)
Age: 45
Discharge: HOME | End: 2025-07-15
Payer: COMMERCIAL

## 2025-07-15 ENCOUNTER — APPOINTMENT (OUTPATIENT)
Dept: GENERAL RADIOLOGY | Age: 45
End: 2025-07-15
Payer: COMMERCIAL

## 2025-07-15 VITALS
TEMPERATURE: 98 F | OXYGEN SATURATION: 99 % | HEART RATE: 84 BPM | RESPIRATION RATE: 16 BRPM | SYSTOLIC BLOOD PRESSURE: 107 MMHG | DIASTOLIC BLOOD PRESSURE: 84 MMHG

## 2025-07-15 DIAGNOSIS — S99.921A INJURY OF RIGHT FOOT, INITIAL ENCOUNTER: Primary | ICD-10-CM

## 2025-07-15 DIAGNOSIS — S91.311A LACERATION OF RIGHT FOOT, INITIAL ENCOUNTER: ICD-10-CM

## 2025-07-15 PROCEDURE — 99204 OFFICE O/P NEW MOD 45 MIN: CPT

## 2025-07-15 PROCEDURE — 73630 X-RAY EXAM OF FOOT: CPT | Performed by: RADIOLOGY

## 2025-07-15 PROCEDURE — 90471 IMMUNIZATION ADMIN: CPT

## 2025-07-15 PROCEDURE — 99213 OFFICE O/P EST LOW 20 MIN: CPT

## 2025-07-15 RX ORDER — ACETAMINOPHEN 500 MG
1000 TABLET ORAL ONCE
Status: COMPLETED | OUTPATIENT
Start: 2025-07-15 | End: 2025-07-15

## 2025-07-15 RX ORDER — CIPROFLOXACIN 500 MG/1
500 TABLET, FILM COATED ORAL 2 TIMES DAILY
Qty: 20 TABLET | Refills: 0 | Status: SHIPPED | OUTPATIENT
Start: 2025-07-15 | End: 2025-07-25

## 2025-07-15 NOTE — ED INITIAL ASSESSMENT (HPI)
Pt hit both feet with weed dimas, lac to inside of right foot and on the 1st, 2nd and 3rd toe on left foot. Unknown last tetanus.

## 2025-07-15 NOTE — DISCHARGE INSTRUCTIONS
The x-ray of your foot was normal.  Please take the antibiotics as prescribed to prevent infection.  We updated your tetanus shot today.    You can take Tylenol or ibuprofen for pain as needed. Alternate tylenol and motrin - each medication can be given every 8 hours and should be alternated.  For example, if you give Ibuprofen at 12 pm, then tylenol would be given at 4 pm then ibuprofen at 8 pm and tylenol again at 12 am.      If you develop any increased swelling to the foot you need to go to the ER.      If you develop any numbness, tingling, weakness, increased pain or any other worsening or concerning complaints you should go to the emergency department.    I made an appointment for tomorrow to close follow-up closely with podiatry.

## 2025-07-15 NOTE — ED PROVIDER NOTES
Patient Seen in: [facility]      History     Chief Complaint   Patient presents with    Laceration/Abrasion     Stated Complaint: leg injury  Subjective:   Johny is a 44-year-old male presenting to the immediate care complaining of an injury to his feet.  Patient states that he was using a metal weed dimas when the metal came off and struck him in the feet.  Patient was wearing socks and gym shoes at the time.  Patient has a laceration and swelling to the right foot.  There are some superficial abrasions to the left toes.  Patient denies any other injury or trauma.  Patient states that he does not have any weakness, numbness, tingling to his feet.  He has having some mild difficulty with dorsiflexion of his right great toe.  Patient states is otherwise been feeling he denies any other concerns or complaints.        Objective:   No pertinent past medical history.          No pertinent past surgical history.            No pertinent social history.          Review of Systems    Positive for stated complaint: Laceration/Abrasion    Other systems are as noted in HPI.  Constitutional and vital signs reviewed.      All other systems reviewed and negative except as noted above.    Physical Exam     ED Triage Vitals [07/15/25 1206]   /84   Pulse 110   Resp 16   Temp 98 °F (36.7 °C)   Temp src Oral   SpO2 99 %   O2 Device None (Room air)     Current:/84   Pulse 84   Temp 98 °F (36.7 °C) (Oral)   Resp 16   SpO2 99%     Physical Exam  Vitals and nursing note reviewed.   Constitutional:       General: He is not in acute distress.     Appearance: Normal appearance. He is not ill-appearing, toxic-appearing or diaphoretic.   HENT:      Head: Normocephalic.   Cardiovascular:      Rate and Rhythm: Normal rate.   Pulmonary:      Effort: Pulmonary effort is normal.   Musculoskeletal:      Cervical back: Normal range of motion.      Right foot: Decreased range of motion. Normal capillary refill. Swelling,  laceration and tenderness present. No deformity, bony tenderness or crepitus. Normal pulse.      Left foot: Normal range of motion and normal capillary refill. Tenderness present. No swelling, deformity, laceration, bony tenderness or crepitus. Normal pulse.      Comments: Right foot:  Positive CMS.  2+ DP and PT pulses. Capillary refill is less than 2 seconds. Patient does have full range of motion to the entire right foot and all toes except for decreased dorsiflexion of the right great toe.  The right ankle, calf, knee, and thigh and hip are unremarkable.  There is no erythema or warmth noted.  No ecchymosis noted.  No obvious deformity is noted.  Patient does have tenderness with palpation to the right fifth metatarsal.  Patient has significant tenderness to the right midfoot over the arch.  Patient has a Y-shaped laceration to the arch of his right foot with a presumed deep puncture towards the heel of the foot.  There is a moderate amount of swelling around the wound and some bleeding.    Left foot:  Positive CMS.  2+ DP and PT pulses. Capillary refill is less than 2 seconds. Patient does have full range of motion to the entire left foot and all 5 toes.  The left ankle, calf, knee, and thigh and hip are unremarkable.  There is no erythema or warmth noted.  No lacerations noted.  No obvious deformity is noted.  No bleeding noted.  Patient does have tenderness with palpation to the left fifth metatarsal or mid-foot.  Plantar foot is unremarkable.    Less than 1 cm abrasion to the dorsal surface of the third toe.  No surrounding tenderness.    Small abrasion to the left great toe at the medial aspect of the distal portion of the toenail.  There is a 25% subungual hematoma to the distal portion of the toenail.  There is some bruising and swelling to the plantar surface of the left great toe.  Patient has no tenderness to the toe.  He has good range of motion.  5/5 strength against resistance with flexion and  extension of the left great toe.       Skin:     General: Skin is warm and dry.      Capillary Refill: Capillary refill takes less than 2 seconds.      Comments: See musculoskeletal exam for foot laceration   Neurological:      General: No focal deficit present.      Mental Status: He is alert and oriented to person, place, and time.   Psychiatric:         Mood and Affect: Mood normal.         Behavior: Behavior normal.         Thought Content: Thought content normal.         Judgment: Judgment normal.               ED Course   Radiology:    XR FOOT, COMPLETE (MIN 3 VIEWS), RIGHT (CPT=73630)   Final Result   PROCEDURE: XR FOOT, COMPLETE (MIN 3 VIEWS), RIGHT (CPT=73630)      INDICATIONS: 44-year-old male with acute foot injury with posttraumatic    pain and swelling medially.      COMPARISON: None available.      TECHNIQUE: 3 views of the right foot were obtained.      FINDINGS:   BONES: No acute fracture or dislocation is evident. No suspicious osseous    lesions are seen. The first metatarsophalangeal joint spaces are    preserved.       SOFT TISSUES: Circumferential soft tissue swelling is apparent. Negative    for discernible radiopaque foreign body.      EFFUSION: None visible.         =====   CONCLUSION:    1.   Soft tissue swelling of the right foot without radiographic evidence    of underlying osseous injury.   2.  Primary osteoarthritic change of the right foot are evident.      Electronically Verified and Signed by Attending Radiologist: Jarad Tripp MD 7/15/2025 1:34 PM   Workstation: [email or web address]        Labs Reviewed - No data to display    MDM     Medical Decision Making  Differential diagnoses reflecting the complexity of care include but are not limited to bony versus soft tissue injury to the feet, puncture wound, laceration, vascular injury.    Comorbidities that add complexity to management include: None  History obtained by an independent source was from: Patient  My independent  interpretations of studies include: X-ray  Discussions of management was done with: Dr. Jerez, podiatry  Patient is well appearing, non-toxic and in no acute distress.  Vital signs are stable.     44-year-old male with an injury to both of his feet from a metal weed dimas.  X-ray done of the right foot given the extensive swelling and tenderness.  No bony injury.  Neurovascularly intact.  Patient does have a laceration/puncture wound to the right foot.  Given the shape and size of the laceration considered suturing because there was some mild oozing of blood.  No spurting of blood; I do not believe there is an arterial injury.  Swelling has improved during the time the patient was in the immediate care.  I did ask Dr. Aguillon to examine the wound to help decide if the suture was necessary.  We put a pressure dressing over the wound injected about 10 minutes later and oozing had stopped.  Given the potential puncture, I did not suture the wound because of the risk of infection.  Left foot injuries were superficial as described above.  No bony tenderness.  Vascularly intact. Recommended wound a sepsis and antibiotic ointment.    I sent a prescription for Cipro because of the laceration/puncture through shoe.    Tetanus was updated today.  Recommended alternating Tylenol Motrin for pain as needed.  I did also reach out to Dr. Jerez from podiatry who agrees with this plan.  I was able to make the patient an appointment for tomorrow morning at 9 AM to follow closely with her in the office.    ED precautions discussed.  Patient (guardian) advised to follow up with PCP in 2-3 days.  Patient (guardian) agrees with this plan of care.  Patient (guardian) verbalizes understanding of discharge instructions and plan of care.  Patient discussed with Dr. Aguillon on the phone who agrees with this plan.      Amount and/or Complexity of Data Reviewed  Radiology: ordered and independent interpretation performed. Decision-making  details documented in ED Course.    Risk  OTC drugs.  Prescription drug management.        Disposition and Plan     Clinical Impression:  1. Injury of right foot, initial encounter    2. Laceration of right foot, initial encounter         Disposition:  Discharge  7/15/2025  3:22 pm    Follow-up:  Silvina Jerez, DPM  [address] [place] [address]  [phone]      7/16/2025 at 9 am          Medications Prescribed:  Discharge Medication List as of 7/15/2025  3:35 PM        START taking these medications    Details   ciprofloxacin 500 MG Oral Tab Take 1 tablet (500 mg total) by mouth 2 (two) times daily for 10 days., Normal, Disp-20 tablet, R-0

## 2025-07-16 ENCOUNTER — OFFICE VISIT (OUTPATIENT)
Dept: ORTHOPEDICS CLINIC | Facility: CLINIC | Age: 45
End: 2025-07-16
Payer: COMMERCIAL

## 2025-07-16 VITALS — WEIGHT: 209 LBS | HEIGHT: 68.11 IN | BODY MASS INDEX: 31.67 KG/M2

## 2025-07-16 DIAGNOSIS — S91.311A LACERATION OF RIGHT FOOT, INITIAL ENCOUNTER: Primary | ICD-10-CM

## 2025-07-16 DIAGNOSIS — S91.115A LACERATION OF LESSER TOE OF LEFT FOOT WITHOUT DAMAGE TO NAIL, FOREIGN BODY PRESENCE UNSPECIFIED, INITIAL ENCOUNTER: ICD-10-CM

## 2025-07-16 PROCEDURE — 99203 OFFICE O/P NEW LOW 30 MIN: CPT | Performed by: PODIATRIST

## 2025-07-16 PROCEDURE — 3008F BODY MASS INDEX DOCD: CPT | Performed by: PODIATRIST

## 2025-07-16 NOTE — PROGRESS NOTES
[facility] New Patient Note    CC:   Chief Complaint   Patient presents with    Leg or Foot Injury     Right foot injury and left foot toes;   DOI: 07/15/25  *Metal blades came off of a weed whacker   Pain Score: 3--6       HPI: The patient is a 44 year old male who presents today with complaints of an injury to both feet with weed wacher yesterday  Metal blades became detached.  He had athletic shoes on.  Seen at  for wound care  I did touch base with provider at     His pain better today  Tetanus given  He is picking up abx today        Past Medical History[1]  Past Surgical History[2]  Current Medications[3]  Allergies[4]  Family History[5]  Social History     Occupational History    Not on file   Tobacco Use    Smoking status: Never    Smokeless tobacco: Never   Vaping Use    Vaping status: Never Used   Substance and Sexual Activity    Alcohol use: Not Currently    Drug use: No    Sexual activity: Yes     Partners: Female        ROS:  Complete ROS reviewed by me and non-contributory to the chief complaint except as mentioned above.    Physical Exam:    Ht 5' 8.11\" (1.73 m)   Wt 209 lb (94.8 kg)   BMI 31.68 kg/m²       Exam right foot  Plantar arch lacerationdoes not appear deep  Mild oozing no active bleeding   Moderate edema, ecchymosis  CFT toes less than 3 sec  Full sensation      Exam left foot  Several toes 1,2,3 wth excoriations and laceration, superficial.  Lateral great toe nail - border mild loosening, mild bleeding     Imaging:  xrays show soft tissue swelling in arch.  No apparent foreign body   personally viewed, independently interpreted and radiology report read.      Assessment/Diagnoses:  Diagnoses and all orders for this visit:    Laceration of right foot, initial encounter    Laceration of lesser toe of left foot without damage to nail, foreign body presence unspecified, initial encounter        Plan:  I reviewed imaging and exam findings with the patient.    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 [place], [place] [id number] [place] [address].[email or web address]  t: [phone]   f: [phone]              This document was partially prepared using Dragon Medical voice recognition software.          [1]   Past Medical History:   ADHD (attention deficit hyperactivity disorder)   [2]   Past Surgical History:  Procedure Laterality Date    Appendectomy      Breast surgery procedure unlisted      B/L breast reduction procedure   [3]   Current Outpatient Medications   Medication Sig Dispense Refill    ciprofloxacin 500 MG Oral Tab Take 1 tablet (500 mg total) by mouth 2 (two) times daily for 10 days. 20 tablet 0    valACYclovir 1 G Oral Tab Take 1 tablet (1,000 mg total) by mouth daily. APPT DUE JULY 90 tablet 0    lisdexamfetamine (VYVANSE) 50 MG Oral Cap Take 1 capsule (50 mg total) by mouth every morning. APPT DUE JULY 30 capsule 0    atorvastatin 10 MG Oral Tab Take 1 tablet (10 mg total) by mouth daily. 90 tablet 1   [4] No Known Allergies  [5]   Family History  Problem Relation Age of Onset    Diabetes Other

## 2025-07-22 ENCOUNTER — OFFICE VISIT (OUTPATIENT)
Dept: ORTHOPEDICS CLINIC | Facility: CLINIC | Age: 45
End: 2025-07-22
Payer: COMMERCIAL

## 2025-07-22 VITALS — HEIGHT: 68.11 IN | WEIGHT: 209 LBS | BODY MASS INDEX: 31.67 KG/M2

## 2025-07-22 DIAGNOSIS — S91.115D: ICD-10-CM

## 2025-07-22 DIAGNOSIS — S91.311D LACERATION OF RIGHT FOOT, SUBSEQUENT ENCOUNTER: Primary | ICD-10-CM

## 2025-07-22 PROCEDURE — 3008F BODY MASS INDEX DOCD: CPT | Performed by: PODIATRIST

## 2025-07-22 PROCEDURE — 99213 OFFICE O/P EST LOW 20 MIN: CPT | Performed by: PODIATRIST

## 2025-07-22 NOTE — PROGRESS NOTES
[facility] Progress Note    Subjective:     Johny is here for check of both feet secondary to an injury with a weed Cindy.  He had a deep laceration of the right arch.  He is doing pretty well        Objective:     Exam right arch area he has 2 Steri-Strips applied and the skin edges are well coapted they appear to be closed.  Very little swelling and erythema.  No signs of infection  Exam left foot the toes are healing well the nail is dried without signs of infection          Imaging: No        Assessment/Plan:     Diagnoses and all orders for this visit:    Laceration of right foot, subsequent encounter    Laceration of lesser toe of left foot without damage to nail, foreign body presence unspecified, subsequent encounter        He is doing well and he can go back to work no restrictions on Monday.  Continue local wound care as before.  Start trying different shoes and gradual increase in activity  Follow up prdmitry Jerez, DPMPodiatric Surgery  FACFAS  [facility]  [address], [address] [place] [id number] [address] [place], [place] [id number] [email or web address]  t: [phone]   f: [phone]            Dragon speech recognition software was used to prepare this note. If a word or phrase is confusing, it is likely do to a failure of recognition. Please contact me with any questions or clarifications.

## 2025-07-25 DIAGNOSIS — F98.8 ATTENTION DEFICIT DISORDER (ADD) WITHOUT HYPERACTIVITY: ICD-10-CM

## 2025-07-25 DIAGNOSIS — B00.9 HERPES: ICD-10-CM

## 2025-07-28 RX ORDER — VALACYCLOVIR HYDROCHLORIDE 1 G/1
1000 TABLET, FILM COATED ORAL DAILY
Qty: 30 TABLET | Refills: 0 | Status: SHIPPED | OUTPATIENT
Start: 2025-07-28

## 2025-07-28 RX ORDER — LISDEXAMFETAMINE DIMESYLATE 50 MG/1
50 CAPSULE ORAL EVERY MORNING
Qty: 30 CAPSULE | Refills: 0 | OUTPATIENT
Start: 2025-07-28

## 2025-07-28 NOTE — TELEPHONE ENCOUNTER
Vyvanse 50 mg  Filled 6-26-25  Qty 30  0 refills  No future appointments.  LOV 1-21-25 SM    Valacyclovir 1 G  Filled 6-26-25   Qty 90  0 refills  No future appointments.  LOV 1-21-25 SM

## 2025-08-01 ENCOUNTER — OFFICE VISIT (OUTPATIENT)
Dept: INTERNAL MEDICINE CLINIC | Facility: CLINIC | Age: 45
End: 2025-08-01

## 2025-08-01 VITALS
RESPIRATION RATE: 16 BRPM | DIASTOLIC BLOOD PRESSURE: 70 MMHG | TEMPERATURE: 98 F | BODY MASS INDEX: 32.71 KG/M2 | HEIGHT: 68.11 IN | WEIGHT: 215.81 LBS | SYSTOLIC BLOOD PRESSURE: 122 MMHG | OXYGEN SATURATION: 98 % | HEART RATE: 102 BPM

## 2025-08-01 DIAGNOSIS — F98.8 ATTENTION DEFICIT DISORDER (ADD) WITHOUT HYPERACTIVITY: ICD-10-CM

## 2025-08-01 PROCEDURE — 3008F BODY MASS INDEX DOCD: CPT | Performed by: FAMILY MEDICINE

## 2025-08-01 PROCEDURE — 3078F DIAST BP <80 MM HG: CPT | Performed by: FAMILY MEDICINE

## 2025-08-01 PROCEDURE — 3074F SYST BP LT 130 MM HG: CPT | Performed by: FAMILY MEDICINE

## 2025-08-01 PROCEDURE — 99213 OFFICE O/P EST LOW 20 MIN: CPT | Performed by: FAMILY MEDICINE

## 2025-08-01 RX ORDER — TADALAFIL 10 MG/1
10 TABLET ORAL
Qty: 30 TABLET | Refills: 0 | Status: SHIPPED | OUTPATIENT
Start: 2025-08-01

## 2025-08-01 RX ORDER — LISDEXAMFETAMINE DIMESYLATE 50 MG/1
50 CAPSULE ORAL EVERY MORNING
Qty: 30 CAPSULE | Refills: 0 | Status: SHIPPED | OUTPATIENT
Start: 2025-08-01

## 2025-08-05 ENCOUNTER — TELEPHONE (OUTPATIENT)
Dept: INTERNAL MEDICINE CLINIC | Facility: CLINIC | Age: 45
End: 2025-08-05

## 2025-08-31 DIAGNOSIS — F98.8 ATTENTION DEFICIT DISORDER (ADD) WITHOUT HYPERACTIVITY: ICD-10-CM

## 2025-08-31 DIAGNOSIS — B00.9 HERPES: ICD-10-CM

## 2025-09-01 RX ORDER — LISDEXAMFETAMINE DIMESYLATE 50 MG/1
50 CAPSULE ORAL EVERY MORNING
Qty: 30 CAPSULE | Refills: 0 | Status: SHIPPED | OUTPATIENT
Start: 2025-09-01

## 2025-09-01 RX ORDER — VALACYCLOVIR HYDROCHLORIDE 1 G/1
1000 TABLET, FILM COATED ORAL DAILY
Qty: 30 TABLET | Refills: 0 | Status: SHIPPED | OUTPATIENT
Start: 2025-09-01

## 2025-09-01 RX ORDER — TADALAFIL 10 MG/1
10 TABLET ORAL
Qty: 30 TABLET | Refills: 0 | Status: SHIPPED | OUTPATIENT
Start: 2025-09-01

## (undated) DIAGNOSIS — E78.5 HYPERLIPIDEMIA, UNSPECIFIED HYPERLIPIDEMIA TYPE: ICD-10-CM

## (undated) NOTE — LETTER
02/06/20        Deandre [address] [id number]      Dear Freddie Li records indicate that you have outstanding lab work and or testing that was ordered for you and has not yet been completed: Fasting lab work   *fast for

## (undated) NOTE — LETTER
Date: 7/22/2025    Patient Name: Johny Estrada          To Whom it may concern:    This letter has been written at the patient's request. The above patient was seen at [facility] for treatment of a medical condition.    This patient should be excused from attending work  from 07/22/25 through 07/27/25.    The patient may return to work on 07/28/25 without limitations.        Sincerely,    Silvina Jerez DPM

## (undated) NOTE — LETTER
Date: 7/16/2025    Patient Name: Johny Estrada          To Whom it may concern:    This letter has been written at the patient's request. The above patient was seen at [facility] for treatment of a medical condition.    This patient should be excused from attending work  from 07/16/25 through 07/22/23 at which time he will be reassessed and a new work status letter will be provided.      Sincerely,    Silvina Jerez DPM

## (undated) NOTE — LETTER
Date & Time: 9/15/2018, 4:41 PM  Patient: Hitesh Nails  Encounter Provider(s):    Laure Grove       To Whom It May Concern:    Brandy Do was seen and treated in our department on 9/15/2018. He should not return to work until 9/17/18.     If you h

## (undated) NOTE — LETTER
Date & Time: 7/15/2025, 3:34 PM  Patient: Johny Estrada  Encounter Provider(s):    Suze Ramesh APRN       To Whom It May Concern:    Johny Estrada was seen and treated in our department on 7/15/2025. He should not return to work until 7/17/2025.    If you have any questions or concerns, please do not hesitate to call.        _____________________________  TEAGAN Carrillo

## (undated) NOTE — LETTER
Date & Time: 9/15/2018, 5:17 PM  Patient: Brett Gamino  Encounter Provider(s):    Hamida Fuentes, [address]       To Whom It May Concern:    Mariella Pyle was seen and treated in our department on 9/15/2018. He should not return to work.     If you have any questi